# Patient Record
Sex: FEMALE | Employment: PART TIME | URBAN - METROPOLITAN AREA
[De-identification: names, ages, dates, MRNs, and addresses within clinical notes are randomized per-mention and may not be internally consistent; named-entity substitution may affect disease eponyms.]

---

## 2021-05-28 ENCOUNTER — RECORDS - HEALTHEAST (OUTPATIENT)
Dept: ADMINISTRATIVE | Facility: CLINIC | Age: 41
End: 2021-05-28

## 2022-03-17 ENCOUNTER — OFFICE VISIT (OUTPATIENT)
Dept: FAMILY MEDICINE | Facility: CLINIC | Age: 42
End: 2022-03-17
Payer: COMMERCIAL

## 2022-03-17 VITALS
HEIGHT: 61 IN | SYSTOLIC BLOOD PRESSURE: 124 MMHG | BODY MASS INDEX: 31.51 KG/M2 | HEART RATE: 75 BPM | OXYGEN SATURATION: 98 % | DIASTOLIC BLOOD PRESSURE: 88 MMHG | WEIGHT: 166.9 LBS

## 2022-03-17 DIAGNOSIS — R76.11 MANTOUX: POSITIVE: Primary | ICD-10-CM

## 2022-03-17 DIAGNOSIS — Z23 NEED FOR IMMUNIZATION AGAINST TYPHOID: ICD-10-CM

## 2022-03-17 PROBLEM — N97.9 FEMALE INFERTILITY: Status: ACTIVE | Noted: 2020-07-30

## 2022-03-17 PROCEDURE — 90471 IMMUNIZATION ADMIN: CPT | Performed by: NURSE PRACTITIONER

## 2022-03-17 PROCEDURE — 90691 TYPHOID VACCINE IM: CPT | Performed by: NURSE PRACTITIONER

## 2022-03-17 PROCEDURE — 99202 OFFICE O/P NEW SF 15 MIN: CPT | Mod: 25 | Performed by: NURSE PRACTITIONER

## 2022-03-17 NOTE — PROGRESS NOTES
"Assessment & Plan     ICD-10-CM    1. Mantoux: positive  R76.11    2. Need for immunization against typhoid  Z23 TYPHOID VACCINE, IM     Patient reports that all she needs/wants right now is a typhoid vaccine.  Previously well-tolerated.  This was given today.  Does have a history of positive Mento.  Not treated for latent tuberculosis.  No pulmonary symptoms.  Reports that she gets periodic chest x-rays which all have been clear.    Reviewed family medicine note x2    Subjective     HPI     Travelling to [facility] and [facility] in 2 weeks. Has had typhoid in the past. Well tolerated.      History of positive mantoux.  Has had negative CXR when needed.    Review of Systems - negative except for what's listed in the HPI      Objective    /88   Pulse 75   Ht 1.537 m (5' 0.5\")   Wt 75.7 kg (166 lb 14.4 oz)   LMP 03/10/2022 (Approximate)   SpO2 98%   Breastfeeding No   BMI 32.06 kg/m    Physical Exam   General appearance - alert, well appearing, and in no distress  Mental status - alert, oriented to person, place, and time  Eyes - sclera white  Lymphatics - no palpable lymphadenopathy  Chest - clear to auscultation, no wheezes, rales or rhonchi, symmetric air entry  Heart - normal rate and regular rhythm, S1 and S2 normal, no murmurs noted  Abdomen - soft, nontender, nondistended, no masses or organomegaly  Neurological - alert, oriented, normal speech, no focal findings or movement disorder noted, neck supple without rigidity, cranial nerves II through XII intact, motor and sensory grossly normal bilaterally, normal muscle tone, no tremors, strength 5/5  Extremities - peripheral pulses normal, no peripheral edema  Skin - normal coloration and turgor.    Abdulaziz Ryder, CNP    This note has been dictated using voice recognition software. Any grammatical or context distortions are unintentional and inherent to the software.    "

## 2022-06-09 ENCOUNTER — OFFICE VISIT (OUTPATIENT)
Dept: FAMILY MEDICINE | Facility: CLINIC | Age: 42
End: 2022-06-09
Payer: COMMERCIAL

## 2022-06-09 VITALS
BODY MASS INDEX: 32.08 KG/M2 | SYSTOLIC BLOOD PRESSURE: 153 MMHG | HEART RATE: 91 BPM | TEMPERATURE: 98.3 F | WEIGHT: 167 LBS | DIASTOLIC BLOOD PRESSURE: 101 MMHG | OXYGEN SATURATION: 100 % | RESPIRATION RATE: 16 BRPM

## 2022-06-09 DIAGNOSIS — R03.0 ELEVATED BLOOD PRESSURE READING WITHOUT DIAGNOSIS OF HYPERTENSION: ICD-10-CM

## 2022-06-09 DIAGNOSIS — J06.9 VIRAL URI WITH COUGH: Primary | ICD-10-CM

## 2022-06-09 PROCEDURE — 99213 OFFICE O/P EST LOW 20 MIN: CPT | Mod: CS | Performed by: FAMILY MEDICINE

## 2022-06-09 PROCEDURE — U0005 INFEC AGEN DETEC AMPLI PROBE: HCPCS | Performed by: FAMILY MEDICINE

## 2022-06-09 PROCEDURE — U0003 INFECTIOUS AGENT DETECTION BY NUCLEIC ACID (DNA OR RNA); SEVERE ACUTE RESPIRATORY SYNDROME CORONAVIRUS 2 (SARS-COV-2) (CORONAVIRUS DISEASE [COVID-19]), AMPLIFIED PROBE TECHNIQUE, MAKING USE OF HIGH THROUGHPUT TECHNOLOGIES AS DESCRIBED BY CMS-2020-01-R: HCPCS | Performed by: FAMILY MEDICINE

## 2022-06-09 NOTE — PROGRESS NOTES
Assessment/ Plan  1. Viral URI with cough  This could be COVID-despite negative home test  Test with PCR  Isolation until test is back  Symptomatic treatment for illness  - Influenza A & B Antigen; Future  - Symptomatic; Unknown COVID-19 Virus (Coronavirus) by PCR; Future  - Symptomatic; Unknown COVID-19 Virus (Coronavirus) by PCR Nose  Elevated blood pressure without diagnosis of hypertension  Remained elevated on recheck  Not taking any decongestants or other medications currently.  Recommend close follow-up, 2 to 3 weeks.  Asked her to check blood pressures at work/home as able.  Bring in numbers.  Body mass index is 32.08 kg/m .    Subjective  CC:  chief complaint  HPI:  4-day history of upper respiratory symptoms.  Itchy eyes, sore throat, cough, subjective fever,  No significant shortness of breath.  Sneezing, abdominal discomfort, no history of allergies.  Works as a nursing assistant.  No known recent contacts.  Patient Active Problem List   Diagnosis     Borderline hypertension     Female infertility     Mantoux: positive     Current medications reviewed as follows:  No current outpatient medications on file prior to visit.  No current facility-administered medications on file prior to visit.     History   Smoking Status     Never Smoker   Smokeless Tobacco     Never Used     Social History     Social History Narrative     Not on file     Patient Care Team:  No Ref-Primary, Physician as PCP - Abdulaziz Garcia NP as Assigned PCP  ROS  As above      Objective  Physical Exam  Vitals:    06/09/22 0827 06/09/22 0915   BP: (!) 153/107 (!) 153/101   BP Location: Left arm Right arm   Patient Position: Sitting Sitting   Cuff Size: Adult Regular Adult Regular   Pulse: 87 91   Resp: 16    Temp: 98.3  F (36.8  C)    TempSrc: Temporal    SpO2: 100%    Weight: 75.8 kg (167 lb)      Patient is alert, oriented and in no distress.    Conjunctiva, lids appear normal.  Nares are normal bilaterally.    TMs are  visualized bilaterally and appear normal    There is no adenopathy in the neck.  Oral cavity is without any notable lesion,   oropharynx appears normal without any erythema, exudate, petechia    Chest appears normal,   auscultation reveals :  normal breath sounds,   no wheezing,  no rales   no rhonchi.    Diagnostics  Pending  Please note: Voice recognition software was used in this dictation.  It may therefore contain typographical errors.    Answers for HPI/ROS submitted by the patient on 6/9/2022  Headache Symptoms are: worsened  How often are you getting headaches or migraines? : Not often  Are you able to do normal daily activities when you have a migraine?: Yes  Migraine Rescue/Relief Medications:: Tylenol  Effectiveness of rescue/relief medications:: I get some relief  Migraine Preventative Medications:: no medications to prevent migraines  ER or UC Visits:: 0 times  Your back pain is: new  What do you think is the original cause of your back pain?: not sure  When did you first notice your back pain? : yesterday  How would you describe your back pain? : sharp  How often do you feel your back pain? : comes and goes  Where is your back pain located? : right lower back  Where does your back pain spread? : nowhere  Since you noticed your back pain, how has it changed? : always present, but gets better and worse  Does your back pain interfere with your job?: No  On a scale of 1-10 (10 being the worst), how strong is your back pain?: 5  What makes your back pain worse? : bending  Acupuncture:: not tried  Acetaminophen: helpful  Activity or Exercise: not tried  Chiropractor: not tried  Cold: not tried  Heat: not tried  Massage: not tried  Muscle relaxants : not tried  NSAIDS (Ibuprofen, Naproxen) : not tried  Opioids: not tried  Physical Therapy: not tried  Rest: helpful  Steroid Injection: not tried  Stretching : not tried  Surgery: not tried  TENS Unit: not tried  Topical pain relievers : helpful  Do you see any  other healthcare providers for your back pain? : None  How many servings of fruits and vegetables do you eat daily?: 2-3  On average, how many sweetened beverages do you drink each day (Examples: soda, juice, sweet tea, etc.  Do NOT count diet or artificially sweetened beverages)?: 2  How many minutes a day do you exercise enough to make your heart beat faster?: 9 or less  How many days a week do you exercise enough to make your heart beat faster?: 3 or less  How many days per week do you miss taking your medication?: 0  What is the reason for your visit today?: Proper check up of how I am feeling  What are your symptoms?: Coughing ,chest, pain, runny nose, headache  How would you describe these symptoms?: Severe  Are your symptoms:: Worsening  Have you had these symptoms before?: No  Is there anything that makes you feel better?: No

## 2022-06-09 NOTE — LETTER
June 9, 2022      Noah Dyegbeh [address]   [place] [address]        To Whom It May Concern,      Noah was seen today in the clinic for illness.  She should be excused from work 6/8 -6/10/2022.  She may return to work when she is feeling better and her COVID PCR test, done today, returns (and is negative).        Sincerely,        Gregory Almeida MD

## 2022-06-10 ENCOUNTER — TELEPHONE (OUTPATIENT)
Dept: NURSING | Facility: CLINIC | Age: 42
End: 2022-06-10
Payer: COMMERCIAL

## 2022-06-10 LAB — SARS-COV-2 RNA RESP QL NAA+PROBE: POSITIVE

## 2022-06-10 NOTE — TELEPHONE ENCOUNTER
Coronavirus (COVID-19) Notification    Caller Name (Patient, parent, daughter/son, grandparent, etc)  Patient    Reason for call  Notify of Positive Coronavirus (COVID-19) lab results, assess symptoms,  review [facility] recommendations    Lab Result    Lab test:  2019-nCoV rRt-PCR or SARS-CoV-2 PCR    Oropharyngeal AND/OR nasopharyngeal swabs is POSITIVE for 2019-nCoV RNA/SARS-COV-2 PCR (COVID-19 virus)      Gather patient reported symptoms   Assessment   Current Symptoms at time of phone call, reported by patient: (if no symptoms, document: No symptoms] Cough   Date of symptom(s) onset (if applicable) 6/5     If at time of call, Patients symptoms have worsened, the Patient should contact 911 or have someone drive them to Emergency Dept promptly:      If Patient calling 911, inform 911 personal that you have tested positive for the Coronavirus (COVID-19).  Place mask on and await 911 to arrive.    If Emergency Dept, If possible, please have another adult drive you to the Emergency Dept but you need to wear mask when in contact with other people.      Treatment Options:   Patient classified as COVID treatment eligible by Epic high risk algorithm: Yes  Is the patient symptomatic at the time of result notification? Yes. Was the onset of symptoms within the last 5 days? Yes.   There are now oral medications available for the treatment of COVID-19.  Taking one of these medications within the first five days of symptoms (when people may not yet feel severely ill) has been shown to make people feel better, prevent them from getting sicker, and preventing hospitalization and death.   Does the patient agree to have a visit with a provider to discuss treatment options? Yes. Is the patient seen at [facility]?  No: Warm transfer caller to [phone] to be scheduled with a virtual urgent provider.  During transfer, instruct  on appropriate time frame for visit     Review information with  Patient    Your result was positive. This means you have COVID-19 (coronavirus).    How can I protect others?    These guidelines are for isolating before returning to work, school or .    If you DO have symptoms    Stay home and away from others     For at least 5 days after your symptoms started, AND    You are fever free for 24 hours (with no medicine that reduces fever), AND    Your other symptoms are better    Wear a mask for 10 full days anytime you are around others    If you DON'T have symptoms    Stay home and away from others for at least 5 days after your positive test    Wear a mask for 10 full days anytime you are around others    There may be different guidelines for healthcare facilities.  Please check with the specific sites before arriving.    If you have been told by a doctor that you were severely ill with COVID-19 or are immunocompromised, you should isolate for at least 10 days.    You should not go back to work until you meet the guidelines above for ending your home isolation. You don't need to be retested for COVID-19 before going back to work--studies show that you won't spread the virus if it's been at least 10 days since your symptoms started (or 20 days, if you have a weak immune system).    Employers, schools, and daycares: This is an official notice for this person's medical guidelines for returning in-person.  They must meet the above guidelines before going back to work, school or  in person.    You will receive a positive COVID-19 letter via [employer] or the mail soon with additional self-care information (exception, no letters will be sent to presurgical/preprocedure patients).    Would you like me to review some of that information with you now?  No    If you were tested for an upcoming procedure, please contact your provider for next steps.    Latanya Long

## 2022-06-11 ENCOUNTER — VIRTUAL VISIT (OUTPATIENT)
Dept: URGENT CARE | Facility: CLINIC | Age: 42
End: 2022-06-11
Payer: COMMERCIAL

## 2022-06-11 DIAGNOSIS — U07.1 CLINICAL DIAGNOSIS OF COVID-19: Primary | ICD-10-CM

## 2022-06-11 PROCEDURE — 99213 OFFICE O/P EST LOW 20 MIN: CPT | Mod: 95

## 2022-06-11 NOTE — PROGRESS NOTES
"Noah is a 41 year old who is being evaluated via a billable telephone visit.          Assessment & Plan     Clinical diagnosis of COVID-19    Patient is beyond treatment window and symptoms are now resolved. Patient questioned if testing is needed again. Explained that PCR test can remain positive for up to 90 days and quarantine guidelines.                 COVID-19 positive patient.  Encounter for consideration of medication intervention. Patient does not qualify for a prescription. Full discussion with patient including medication options, risks and benefits. Potential drug interactions reviewed with patient.       Estimated body mass index is 32.08 kg/m  as calculated from the following:    Height as of 3/17/22: 1.537 m (5' 0.5\").    Weight as of 6/9/22: 75.8 kg (167 lb).  No results found for: GFRESTIMATED  Lab Results   Component Value Date    [id number] Positive (A) 06/09/2022       No follow-ups on file.    Virtual Urgent Care  [facility] VIRTUAL URGENT [facility]    Subjective   Noah is a 41 year old who presents for the following health issues     HPI       COVID-19 Symptom Review  How many days ago did these symptoms start? 6/5    Are any of the following symptoms significant for you?    New or worsening difficulty breathing? No    Worsening cough? No    Fever or chills? No    Headache: no    Sore throat: no    Chest pain: no    Diarrhea: no    Body aches? no    What treatments has patient tried? none   Does patient live in a nursing home, group home, or shelter? no  Does patient have a way to get food/medications during quarantined? Yes, I have a friend or family member who can help me.              Review of Systems   Constitutional, HEENT, cardiovascular, pulmonary, gi and gu systems are negative, except as otherwise noted.      Objective           Vitals:  No vitals were obtained today due to virtual visit.    Physical Exam   healthy, alert and no distress  PSYCH: Alert and oriented times 3; " coherent speech, normal   rate and volume, able to articulate logical thoughts, able   to abstract reason, no tangential thoughts, no hallucinations   or delusions  Her affect is normal and pleasant  RESP: No cough, no audible wheezing, able to talk in full sentences  Remainder of exam unable to be completed due to telephone visits                Phone call duration: 4 minutes

## 2022-08-15 NOTE — PROGRESS NOTES
"  Assessment & Plan       ICD-10-CM    1. Chronic cough  R05.3 fluticasone-salmeterol (ADVAIR) 100-50 MCG/ACT inhaler   Talk to patient about her concerns at this point the possibility is asthma related.  We will start Advair twice a day.  Warning signs side effects were discussed.  She is to follow-up in 4 weeks.  Return in about 4 weeks (around 9/13/2022) for recheck.    Shawn Persaud PA-C  [facility]   Noah is a 42 year old accompanied by her self, presenting for the following health issues:  Cough      History of Present Illness       Reason for visit:  Cough  Symptom onset:  More than a month  Symptoms include:  Cough  Symptom intensity:  Severe  Symptom progression:  Staying the same  Had these symptoms before:  Yes  Has tried/received treatment for these symptoms:  No  What makes it worse:  No  What makes it better:  No    She eats 4 or more servings of fruits and vegetables daily.She consumes 3 sweetened beverage(s) daily.She exercises with enough effort to increase her heart rate 9 or less minutes per day.  She exercises with enough effort to increase her heart rate 3 or less days per week.   She is taking medications regularly.   cough off and on for years.   No wheezing or short of breath.   No runny nose, sneezing or itchy eye.   No GERD symptoms.  Tx: cough drops.   History of positive Mantoux. tx for LTB. Neg chest x-ray in the past.     Previous COVID testing and neg.     Care Everywhere Reviewed      Review of Systems   Constitutional, HEENT, cardiovascular, pulmonary, gi and gu systems are negative, except as otherwise noted.      Objective    /89   Pulse 64   Temp 98.2  F (36.8  C) (Tympanic)   Resp 14   Ht 1.537 m (5' 0.5\")   Wt 74.4 kg (164 lb)   SpO2 100%   BMI 31.50 kg/m    Body mass index is 31.5 kg/m .  Physical Exam   GENERAL: healthy, alert and no distress  EYES: Eyes grossly normal to inspection, PERRL and conjunctivae and sclerae " normal  HENT: ear canals and TM's normal, nose and mouth without ulcers or lesions  NECK: no adenopathy, no asymmetry, masses, or scars and thyroid normal to palpation  RESP: lungs clear to auscultation - no rales, rhonchi or wheezes  CV: regular rate and rhythm, normal S1 S2, no S3 or S4, no murmur, click or rub, no peripheral edema and peripheral pulses strong  ABDOMEN: soft, nontender, no hepatosplenomegaly, no masses and bowel sounds normal  MS: no gross musculoskeletal defects noted, no edema  SKIN: no suspicious lesions or rashes  NEURO: Normal strength and tone, mentation intact and speech normal  PSYCH: mentation appears normal, affect normal/bright

## 2022-08-16 ENCOUNTER — OFFICE VISIT (OUTPATIENT)
Dept: FAMILY MEDICINE | Facility: CLINIC | Age: 42
End: 2022-08-16
Payer: COMMERCIAL

## 2022-08-16 VITALS
RESPIRATION RATE: 14 BRPM | HEIGHT: 61 IN | TEMPERATURE: 98.2 F | OXYGEN SATURATION: 100 % | BODY MASS INDEX: 30.96 KG/M2 | SYSTOLIC BLOOD PRESSURE: 139 MMHG | HEART RATE: 64 BPM | DIASTOLIC BLOOD PRESSURE: 89 MMHG | WEIGHT: 164 LBS

## 2022-08-16 DIAGNOSIS — R05.3 CHRONIC COUGH: Primary | ICD-10-CM

## 2022-08-16 PROCEDURE — 99213 OFFICE O/P EST LOW 20 MIN: CPT | Performed by: PHYSICIAN ASSISTANT

## 2022-08-16 RX ORDER — FLUTICASONE PROPIONATE AND SALMETEROL 100; 50 UG/1; UG/1
1 POWDER RESPIRATORY (INHALATION) 2 TIMES DAILY
Qty: 1 EACH | Refills: 0 | Status: SHIPPED | OUTPATIENT
Start: 2022-08-16 | End: 2023-02-20

## 2022-08-16 ASSESSMENT — PAIN SCALES - GENERAL: PAINLEVEL: NO PAIN (0)

## 2022-12-07 ENCOUNTER — OFFICE VISIT (OUTPATIENT)
Dept: FAMILY MEDICINE | Facility: CLINIC | Age: 42
End: 2022-12-07
Payer: COMMERCIAL

## 2022-12-07 VITALS
WEIGHT: 165.8 LBS | HEART RATE: 65 BPM | BODY MASS INDEX: 30.51 KG/M2 | DIASTOLIC BLOOD PRESSURE: 99 MMHG | HEIGHT: 62 IN | SYSTOLIC BLOOD PRESSURE: 152 MMHG | TEMPERATURE: 98 F | OXYGEN SATURATION: 99 % | RESPIRATION RATE: 16 BRPM

## 2022-12-07 DIAGNOSIS — Z71.84 TRAVEL ADVICE ENCOUNTER: Primary | ICD-10-CM

## 2022-12-07 PROCEDURE — 99402 PREV MED CNSL INDIV APPRX 30: CPT | Mod: GA | Performed by: NURSE PRACTITIONER

## 2022-12-07 RX ORDER — ATOVAQUONE AND PROGUANIL HYDROCHLORIDE 250; 100 MG/1; MG/1
1 TABLET, FILM COATED ORAL DAILY
Qty: 33 TABLET | Refills: 0 | Status: SHIPPED | OUTPATIENT
Start: 2022-12-07 | End: 2023-02-20

## 2022-12-07 ASSESSMENT — PAIN SCALES - GENERAL: PAINLEVEL: NO PAIN (0)

## 2022-12-07 NOTE — PATIENT INSTRUCTIONS
Thank you for visiting the [facility] : [phone]  Today December 7, 2022 you received the    NO Vaccines today     Covid and flu is recommended       Follow up vaccine appointments can be made as a NURSE ONLY visit at the Travel Clinic, (BE PREPARED TO WAIT, ) or at designated [facility].    If you are receiving the Rabies vaccines series, it is important that you follow the exact schedule ordered.     Pre-travel     We recommend that you purchase Medical Evacuation Insurance prior to your departure.  Https://wwwnc.cdc.gov/travel/page/insurance    [place] your travel plans with the  Department of ideeli through STEP ( Smart Traveler Enrollment Program ) https://step.state.gov.  STEP is a free service to allow U[place] citizens and nationals traveling and living abroad to enroll their trip with the nearest U.S. Embassy or Consulate.    Animal Exposure: Avoid all mammals even if they look healthy.  If there is a bite, scratch or even a lick, wash area immediately with soap and water for 15 minutes and seek medical care within 24 hours for evaluation of Rabies post exposure treatment.  Contact your Medical Evacuation Insurance.    COVID 19 (Sars Cov2) prevention strategies  Physical distancing: Maintain 6 foot (2m) from others.              Avoid large gatherings and public transportation.   Avoid indoor shopping malls, theaters and restaurants   Practice consistent mask wearing covering the nose, mouth and underneath the chin when unable to maintain 6 foot distance from others.  Hand washing: frequent, thorough handwashing with soap and water for 20 seconds (or using a hand  containing 60% alcohol)   Avoid touching face, nose, eyes, mouth unless you have done appropriate hand washing as above.   Clean high touch surfaces with approved disinfectant against Covid 19  (70% Ethanol ) or a bleach solution (add 20 mL (4 teaspoons) of bleach to 1 L (1 quart) of  water;)  Be careful not to breath or touch bleach.      Travel Covid 19 Testing:  updated 12/06/2021  International travelers: Pre-travel: diagnostic testing (antigen or PCR) may be required for entry:  See country specific Embassy websites or airline websites.    Post travel: CDC recommends getting tested 3-5 days after your trip     COVID-19 testing scheduling number for pre-travel through [facility]  [phone] (Must have an order). Available 24 hours a day.  You can also schedule through My Chart.     Post-travel illness:  Contact your provider or [facility] if you develop a fever, rash, cough, diarrhea or other symptoms for up to 1 year after travel.  Inform your healthcare provider when and where you traveled to.    Please call the [facility] with any questions [phone]  Or send your provider a 'My Chart' note.

## 2022-12-07 NOTE — PROGRESS NOTES
"Nurse Note ( Pre-Travel Consult)      Itinerary:  [facility]      Departure Date: 12-13-22      Return Date: 1-4-23      Length of Trip 3 weeks      Reason for Travel: Visiting friends and relatives           Urban or rural: urban      Accommodations: Family home        IMMUNIZATION HISTORY  Have you received any immunizations within the past 4 weeks?  No  Have you ever fainted from having your blood drawn or from an injection?  No  Have you ever had a fever reaction to vaccination?  No  Have you ever had any bad reaction or side effect from any vaccination?  No  Have you ever had hepatitis A or B vaccine?  Yes  Do you live (or work closely) with anyone who has AIDS, an AIDS-like condition, any other immune disorder or who is on chemotherapy for cancer?  No  Do you have a family history of immunodeficiency?  No  Have you received any injection of immune globulin or any blood products during the past 12 months?  No    Patient roomed by Christine Larry RN      Subjective Wonlue T Kegbeh is a 42 year old female seen today alone for counsultation for international travel.   Patient will be departing in  5 day(s) and  traveling alone.      Patient itinerary :  will be in the urban region [facility]  which risk for Malaria and Yellow Fever. exposure.      Patient's activities will include visiting friends and relatives.    Patient's country of birth is [place] [facility] [place]    Special medical concerns: elevated Blood pressure today, re- checked     Pre-travel questionnaire was completed by patient and reviewed by provider.     Vitals: BP (!) 160/99   Pulse 65   Temp 98  F (36.7  C) (Oral)   Resp 16   Ht 1.568 m (5' 1.75\")   Wt 75.2 kg (165 lb 12.8 oz)   LMP 11/30/2022 (Exact Date)   SpO2 99%   BMI 30.57 kg/m    BMI= Body mass index is 30.57 kg/m .    EXAM:  General:  Well-nourished, well-developed in no acute distress.  Appears to be stated age, interacts appropriately and expresses understanding of information given to " patient.    Current Outpatient Medications   Medication Sig Dispense Refill     fluticasone-salmeterol (ADVAIR) 100-50 MCG/ACT inhaler Inhale 1 puff into the lungs 2 times daily (Patient not taking: Reported on 12/7/2022) 1 each 0     Patient Active Problem List   Diagnosis     Borderline hypertension     Female infertility     Mantoux: positive     No Known Allergies      Immunizations discussed include:   Covid 19: Up to date  Hepatitis A:  Immune  Hepatitis B: Immune  Influenza: Declined    Typhoid: Up to date  Rabies: Declined  reviewed managment of a animal bite or scratch (washing wound, seek medical care within 24 hours for post exposure prophylaxis ) and Insufficient time to vaccinate  Yellow Fever: Up to date  Japanese Encephalitis: Not indicated  Meningococcus: Not indicated  Tetanus/Diphtheria: Up to date  Measles/Mumps/Rubella: Up to date  Cholera: Not needed and Not available  Polio: Not indicated  Pneumococcal: Under age of 65  Varicella: Up to date  Shingrix: Not indicated  HPV:  Not indicated     TB: low risk     Altitude Exposure on this trip: no  Past tolerance to Altitude: na    ASSESSMENT/PLAN:  Noah was seen today for travel clinic.    Diagnoses and all orders for this visit:    Travel advice encounter  -     atovaquone-proguanil (MALARONE) 250-100 MG tablet; Take 1 tablet by mouth daily Start 2 days before exposure to Malaria and continue daily till  7 days after exposure.      I have reviewed general recommendations for safe travel   including: food/water precautions, insect precautions, safer sex   practices given high prevalence of Zika, HIV and other STDs,   roadway safety. Educational materials and [facility] report provided.    Malaraia prophylaxis recommended: Malarone  Symptomatic treatment for traveler's diarrhea: loperamide/diphenoxylate  Altitude illness prevention and treatment: na      Evacuation insurance advised and resources were provided to patient.    Total visit time 30 minutes   with over 50% of time spent counseling patient and shared decision making as detailed above.    Birgit Hammond CNP  Certificate in Travel Health

## 2023-01-31 ENCOUNTER — TELEPHONE (OUTPATIENT)
Dept: FAMILY MEDICINE | Facility: CLINIC | Age: 43
End: 2023-01-31
Payer: COMMERCIAL

## 2023-01-31 NOTE — TELEPHONE ENCOUNTER
Reason for Call:  Appointment Request    Patient requesting this type of appt:  Preventive     Requested provider: No Ref-Primary, Physician    Reason patient unable to be scheduled: Has no PCP, needs PAP test    When does patient want to be seen/preferred time: 3-7 days    Comments: call back to make appt    Okay to leave a detailed message?: Yes at Cell number on file:    Telephone Information:   Mobile [phone]       Call taken on 1/31/2023 at 1:41 PM by Jennifer Salinas

## 2023-02-20 ENCOUNTER — OFFICE VISIT (OUTPATIENT)
Dept: FAMILY MEDICINE | Facility: CLINIC | Age: 43
End: 2023-02-20
Payer: COMMERCIAL

## 2023-02-20 VITALS
OXYGEN SATURATION: 100 % | HEIGHT: 62 IN | SYSTOLIC BLOOD PRESSURE: 148 MMHG | RESPIRATION RATE: 20 BRPM | TEMPERATURE: 98.2 F | WEIGHT: 168.6 LBS | BODY MASS INDEX: 31.03 KG/M2 | DIASTOLIC BLOOD PRESSURE: 98 MMHG | HEART RATE: 81 BPM

## 2023-02-20 DIAGNOSIS — Z12.4 CERVICAL CANCER SCREENING: ICD-10-CM

## 2023-02-20 DIAGNOSIS — Z31.9 DESIRE FOR PREGNANCY: ICD-10-CM

## 2023-02-20 DIAGNOSIS — Z11.59 NEED FOR HEPATITIS C SCREENING TEST: ICD-10-CM

## 2023-02-20 DIAGNOSIS — I10 BENIGN ESSENTIAL HYPERTENSION: ICD-10-CM

## 2023-02-20 DIAGNOSIS — Z00.00 ROUTINE GENERAL MEDICAL EXAMINATION AT A HEALTH CARE FACILITY: Primary | ICD-10-CM

## 2023-02-20 DIAGNOSIS — Z11.4 SCREENING FOR HIV (HUMAN IMMUNODEFICIENCY VIRUS): ICD-10-CM

## 2023-02-20 DIAGNOSIS — Z13.220 SCREENING FOR HYPERLIPIDEMIA: ICD-10-CM

## 2023-02-20 LAB
ALBUMIN SERPL BCG-MCNC: 4 G/DL (ref 3.5–5.2)
ALP SERPL-CCNC: 65 U/L (ref 35–104)
ALT SERPL W P-5'-P-CCNC: 9 U/L (ref 10–35)
ANION GAP SERPL CALCULATED.3IONS-SCNC: 9 MMOL/L (ref 7–15)
AST SERPL W P-5'-P-CCNC: 23 U/L (ref 10–35)
BILIRUB SERPL-MCNC: 0.2 MG/DL
BUN SERPL-MCNC: 10.4 MG/DL (ref 6–20)
CALCIUM SERPL-MCNC: 9.1 MG/DL (ref 8.6–10)
CHLORIDE SERPL-SCNC: 102 MMOL/L (ref 98–107)
CHOLEST SERPL-MCNC: 274 MG/DL
CREAT SERPL-MCNC: 0.95 MG/DL (ref 0.51–0.95)
DEPRECATED HCO3 PLAS-SCNC: 29 MMOL/L (ref 22–29)
ERYTHROCYTE [DISTWIDTH] IN BLOOD BY AUTOMATED COUNT: 12.7 % (ref 10–15)
GFR SERPL CREATININE-BSD FRML MDRD: 77 ML/MIN/1.73M2
GLUCOSE SERPL-MCNC: 70 MG/DL (ref 70–99)
HCT VFR BLD AUTO: 37.3 % (ref 35–47)
HDLC SERPL-MCNC: 59 MG/DL
HGB BLD-MCNC: 12.1 G/DL (ref 11.7–15.7)
LDLC SERPL CALC-MCNC: 197 MG/DL
MCH RBC QN AUTO: 26 PG (ref 26.5–33)
MCHC RBC AUTO-ENTMCNC: 32.4 G/DL (ref 31.5–36.5)
MCV RBC AUTO: 80 FL (ref 78–100)
NONHDLC SERPL-MCNC: 215 MG/DL
PLATELET # BLD AUTO: 192 10E3/UL (ref 150–450)
POTASSIUM SERPL-SCNC: 3.1 MMOL/L (ref 3.4–5.3)
PROT SERPL-MCNC: 7.2 G/DL (ref 6.4–8.3)
RBC # BLD AUTO: 4.65 10E6/UL (ref 3.8–5.2)
SODIUM SERPL-SCNC: 140 MMOL/L (ref 136–145)
TRIGL SERPL-MCNC: 90 MG/DL
WBC # BLD AUTO: 4.7 10E3/UL (ref 4–11)

## 2023-02-20 PROCEDURE — 36415 COLL VENOUS BLD VENIPUNCTURE: CPT

## 2023-02-20 PROCEDURE — 99213 OFFICE O/P EST LOW 20 MIN: CPT | Mod: 25

## 2023-02-20 PROCEDURE — 99396 PREV VISIT EST AGE 40-64: CPT

## 2023-02-20 PROCEDURE — 86803 HEPATITIS C AB TEST: CPT

## 2023-02-20 PROCEDURE — 87389 HIV-1 AG W/HIV-1&-2 AB AG IA: CPT

## 2023-02-20 PROCEDURE — 80061 LIPID PANEL: CPT

## 2023-02-20 PROCEDURE — 80053 COMPREHEN METABOLIC PANEL: CPT

## 2023-02-20 PROCEDURE — 85027 COMPLETE CBC AUTOMATED: CPT

## 2023-02-20 RX ORDER — LABETALOL 100 MG/1
100 TABLET, FILM COATED ORAL 2 TIMES DAILY
Qty: 60 TABLET | Refills: 0 | Status: SHIPPED | OUTPATIENT
Start: 2023-02-20 | End: 2023-02-24

## 2023-02-20 RX ORDER — PRENATAL VIT/IRON FUM/FOLIC AC 27MG-0.8MG
1 TABLET ORAL DAILY
Qty: 90 TABLET | Refills: 3 | Status: SHIPPED | OUTPATIENT
Start: 2023-02-20

## 2023-02-20 ASSESSMENT — ENCOUNTER SYMPTOMS
EYE PAIN: 0
FREQUENCY: 0
MYALGIAS: 0
HEMATOCHEZIA: 0
DIZZINESS: 0
ABDOMINAL PAIN: 0
HEMATURIA: 0
SHORTNESS OF BREATH: 0
DYSURIA: 0
PARESTHESIAS: 0
JOINT SWELLING: 0
HEARTBURN: 0
NAUSEA: 0
CONSTIPATION: 0
ARTHRALGIAS: 0
HEADACHES: 0
BREAST MASS: 0
DIARRHEA: 0
WEAKNESS: 0
NERVOUS/ANXIOUS: 0
FEVER: 0
COUGH: 0
CHILLS: 0
PALPITATIONS: 0
SORE THROAT: 0

## 2023-02-20 ASSESSMENT — PAIN SCALES - GENERAL: PAINLEVEL: NO PAIN (0)

## 2023-02-20 NOTE — PATIENT INSTRUCTIONS
Another option for OB/GYN care is [facility] the phone number is [phone].    You can chose any prenatal vitamin, just make sure it has 0.4 mg of folic acid.    Please reach out with any concerns with the blood pressure medication, such as sign or symptoms of low blood pressure or low heart rate (dizziness, lightheadedness, passing out, or fatigue).

## 2023-02-20 NOTE — PROGRESS NOTES
SUBJECTIVE:   CC: Noah is an 42 year old who presents for preventive health visit.     Patient has been advised of split billing requirements and indicates understanding: Yes     Healthy Habits:     Getting at least 3 servings of Calcium per day:  Yes    Bi-annual eye exam:  NO    Dental care twice a year:  NO    Sleep apnea or symptoms of sleep apnea:  None    Diet:  Regular (no restrictions)    Frequency of exercise:  None    Taking medications regularly:  Yes    Medication side effects:  None    PHQ-2 Total Score: 1    Additional concerns today:  No    Patient reports she has been trying to pregnant.  Her and her spouse have been trying for at least 6 months.  She gets a regular period every month.  She has never been pregnant.  She has not had any work-up for this.  They have been having unprotected intercourse, but patient does not disclose how often, and does not sound like she is tracking ovulation.    Patient has no medical history.  She denies any family history of cancer, diabetes, heart disease.  She does not smoke.  She is not drinking.    Today's PHQ-2 Score:   PHQ-2 ( 1999 Pfizer) 2/20/2023   Q1: Little interest or pleasure in doing things 1   Q2: Feeling down, depressed or hopeless 0   PHQ-2 Score 1   Q1: Little interest or pleasure in doing things Not at all   Q2: Feeling down, depressed or hopeless Not at all   PHQ-2 Score 0     Have you ever done Advance Care Planning? (For example, a Health Directive, POLST, or a discussion with a medical provider or your loved ones about your wishes): No, advance care planning information given to patient to review.  Patient plans to discuss their wishes with loved ones or provider.      Social History     Tobacco Use     Smoking status: Never     Smokeless tobacco: Never   Substance Use Topics     Alcohol use: Never     If you drink alcohol do you typically have >3 drinks per day or >7 drinks per week? Not applicable    Alcohol Use 2/20/2023   Prescreen: >3  "drinks/day or >7 drinks/week? Not Applicable     Reviewed orders with patient.  Reviewed health maintenance and updated orders accordingly - Yes    Breast Cancer Screening:    Breast CA Risk Assessment (FHS-7) 2/20/2023   Do you have a family history of breast, colon, or ovarian cancer? No / Unknown     Patient under 40 years of age: Routine Mammogram Screening not recommended.   Pertinent mammograms are reviewed under the imaging tab.    History of abnormal Pap smear: NO - age 30-65 PAP every 5 years with negative HPV co-testing recommended     Reviewed and updated as needed this visit by clinical staff   Tobacco  Allergies  Meds            Reviewed and updated as needed this visit by Provider   Tobacco   Meds   Med Hx   Fam Hx           Review of Systems   Constitutional: Negative for chills and fever.   HENT: Negative for congestion, ear pain, hearing loss and sore throat.    Eyes: Negative for pain and visual disturbance.   Respiratory: Negative for cough and shortness of breath.    Cardiovascular: Negative for chest pain, palpitations and peripheral edema.   Gastrointestinal: Negative for abdominal pain, constipation, diarrhea, heartburn, hematochezia and nausea.   Breasts:  Negative for breast mass and discharge.   Genitourinary: Negative for dysuria, frequency, genital sores, hematuria, pelvic pain, urgency, vaginal bleeding and vaginal discharge.   Musculoskeletal: Negative for arthralgias, joint swelling and myalgias.   Skin: Negative for rash.   Neurological: Negative for dizziness, weakness, headaches and paresthesias.   Psychiatric/Behavioral: Negative for mood changes. The patient is not nervous/anxious.        OBJECTIVE:   BP (!) 152/100 (BP Location: Right arm, Patient Position: Sitting, Cuff Size: Adult Regular)   Pulse 81   Temp 98.2  F (36.8  C) (Oral)   Resp 20   Ht 1.562 m (5' 1.5\")   Wt 76.5 kg (168 lb 9.6 oz)   LMP 01/23/2023 (Exact Date)   SpO2 100%   BMI 31.34 kg/m    Physical " Exam  Vitals reviewed.   Constitutional:       General: She is not in acute distress.  HENT:      Right Ear: Tympanic membrane, ear canal and external ear normal.      Left Ear: Tympanic membrane, ear canal and external ear normal.   Eyes:      Extraocular Movements: Extraocular movements intact.      Pupils: Pupils are equal, round, and reactive to light.   Cardiovascular:      Rate and Rhythm: Normal rate and regular rhythm.      Pulses: Normal pulses.      Heart sounds: Normal heart sounds. No murmur heard.  Pulmonary:      Effort: Pulmonary effort is normal. No respiratory distress.      Breath sounds: No wheezing.   Abdominal:      General: Abdomen is flat. Bowel sounds are normal. There is no distension.   Musculoskeletal:         General: Normal range of motion.      Cervical back: Normal range of motion. No rigidity.      Right lower leg: No edema.      Left lower leg: No edema.   Lymphadenopathy:      Cervical: No cervical adenopathy.   Neurological:      General: No focal deficit present.      Mental Status: She is alert.      Cranial Nerves: No cranial nerve deficit.      Sensory: No sensory deficit.      Motor: No weakness.      Gait: Gait normal.   Psychiatric:         Mood and Affect: Mood normal.         Thought Content: Thought content normal.       ASSESSMENT/PLAN:   Routine general medical examination at a health care facility  Patient presents for routine medical exam.  Biggest concern today is that she wants to get pregnant.  See below.  Elevated blood pressure on exam today.  Outside of this, no abnormal findings on physical exam.  We will get blood work today.  She has no family history of breast cancer, last Pap with HPV testing was 3 years ago.    Desire for pregnancy  Overall patient has been trying for over 6 months to get pregnant with no success.  She has regular menses every month.  She has not been seen by OB/GYN.  Recommend starting prenatal vitamin as she has not done this yet.  We  will check baseline blood work today, but overall refer to OB/GYN for possible infertility management as well as if she gets pregnant she would be considered high risk based on her age.  Appreciate recommendations and following.  - REVIEW OF HEALTH MAINTENANCE PROTOCOL ORDERS  - CBC with platelets; Future  - Prenatal Vit-Fe Fumarate-FA (PRENATAL MULTIVITAMIN W/IRON) 27-0.8 MG tablet; Take 1 tablet by mouth daily  Dispense: 90 tablet; Refill: 3  - Ob/Gyn Referral; Future    Benign essential hypertension  Upon review of the chart patient has had elevated blood pressures on several visits.  Not on medication.  We did discuss that if she is actively trying to get pregnant there are a few blood pressure meds that are recommended.  We will start her on labetalol, but I discussed with her if she develops any signs of lightheadedness, dizziness, passing out, feeling faint or fatigued, she should stop and contact me.  I will bring her back in this Friday for another blood pressure check and heart rate check.  She is going out of town from February 24 to March 11.  If she has any concerns or develops any symptoms over that time she should be seen where she is on vacation.  She has no chest pain, leg swelling, headache, or vision changes.  We will check a CMP today.  - Comprehensive metabolic panel (BMP + Alb, Alk Phos, ALT, AST, Total. Bili, TP); Future  - labetalol (NORMODYNE) 100 MG tablet; Take 1 tablet (100 mg) by mouth 2 times daily  Dispense: 60 tablet; Refill: 0    Screening for hyperlipidemia  Discussed recommended screening, ordered.  - Lipid Profile (Chol, Trig, HDL, LDL calc); Future    Screening for HIV (human immunodeficiency virus)  Discussed, ordered.  - HIV Antigen Antibody Combo; Future    Need for hepatitis C screening test  Discussed, ordered.  - Hepatitis C Screen Reflex to HCV RNA Quant and Genotype; Future    Cervical cancer screening  Patient had Pap done in 2020.  I reviewed the notes and the  "recommendation was 5 years.  She had a normal Pap as well as no positive HPV screen.  She reports no history of positive screenings, would recommend screening in .    Patient has been advised of split billing requirements and indicates understanding: Yes    COUNSELING:  Reviewed preventive health counseling, as reflected in patient instructions       Regular exercise       Healthy diet/nutrition       Family planning       Folic Acid       Safe sex practices/STD prevention       Consider Hep C screening for all patients one time for ages 18-79 years       HIV screeninx in teen years, 1x in adult years, and at intervals if high risk    BMI:   Estimated body mass index is 31.34 kg/m  as calculated from the following:    Height as of this encounter: 1.562 m (5' 1.5\").    Weight as of this encounter: 76.5 kg (168 lb 9.6 oz).   Weight management plan: Discussed healthy diet and exercise guidelines    She reports that she has never smoked. She has never used smokeless tobacco.    At the end of the visit, I confirmed understanding of what was discussed. Patient has no further questions or concerns that were brought up at this time.     Eleazar Crespo, DNP, APRN, FNP-C  "

## 2023-02-21 LAB
HCV AB SERPL QL IA: NONREACTIVE
HIV 1+2 AB+HIV1 P24 AG SERPL QL IA: NONREACTIVE

## 2023-02-24 ENCOUNTER — ALLIED HEALTH/NURSE VISIT (OUTPATIENT)
Dept: FAMILY MEDICINE | Facility: CLINIC | Age: 43
End: 2023-02-24
Payer: COMMERCIAL

## 2023-02-24 VITALS — DIASTOLIC BLOOD PRESSURE: 100 MMHG | HEART RATE: 56 BPM | SYSTOLIC BLOOD PRESSURE: 150 MMHG

## 2023-02-24 DIAGNOSIS — I10 BENIGN ESSENTIAL HYPERTENSION: Primary | ICD-10-CM

## 2023-02-24 PROCEDURE — 99207 PR NO CHARGE NURSE ONLY: CPT

## 2023-02-24 RX ORDER — AMLODIPINE BESYLATE 5 MG/1
5 TABLET ORAL DAILY
Qty: 30 TABLET | Refills: 0 | Status: SHIPPED | OUTPATIENT
Start: 2023-02-24 | End: 2023-04-10

## 2023-02-24 NOTE — PROGRESS NOTES
I met with Wonlue T Kegbeh at the request of Ora Crespo to recheck her blood pressure.  Blood pressure medications on the med list were reviewed with patient.    Patient has taken all medications as per usual regimen: Yes  Patient reports tolerating them without any issues or concerns: Yes    Vitals:    02/24/23 1256 02/24/23 1305   BP: (!) 156/94 (!) 150/100   BP Location: Right arm Right arm   Patient Position: Sitting Sitting   Cuff Size: Adult Large Adult Large   Pulse: 59 56       After 5 minutes, the patient's blood pressure remained greater than or equal to 140/90.    Is the patient currently having any chest pain? No  Does the patient currently have a headache? No  Does the patient currently have any vision changes? No  Does the patient currently have any nausea? No  Does the patient currently have any abdominal pain? No    The previous encounter was reviewed.  The patient was discharged and the note will be sent to the provider for final review.

## 2023-03-13 ENCOUNTER — OFFICE VISIT (OUTPATIENT)
Dept: FAMILY MEDICINE | Facility: CLINIC | Age: 43
End: 2023-03-13
Payer: COMMERCIAL

## 2023-03-13 ENCOUNTER — HOSPITAL ENCOUNTER (OUTPATIENT)
Dept: GENERAL RADIOLOGY | Facility: HOSPITAL | Age: 43
Discharge: HOME | End: 2023-03-13
Payer: COMMERCIAL

## 2023-03-13 VITALS
SYSTOLIC BLOOD PRESSURE: 178 MMHG | TEMPERATURE: 98.1 F | DIASTOLIC BLOOD PRESSURE: 100 MMHG | OXYGEN SATURATION: 98 % | WEIGHT: 164 LBS | HEIGHT: 62 IN | RESPIRATION RATE: 24 BRPM | HEART RATE: 60 BPM | BODY MASS INDEX: 30.18 KG/M2

## 2023-03-13 DIAGNOSIS — R05.2 SUBACUTE COUGH: ICD-10-CM

## 2023-03-13 DIAGNOSIS — M54.50 ACUTE BILATERAL LOW BACK PAIN WITHOUT SCIATICA: ICD-10-CM

## 2023-03-13 DIAGNOSIS — K62.5 RECTAL BLEEDING: ICD-10-CM

## 2023-03-13 DIAGNOSIS — R05.2 SUBACUTE COUGH: Primary | ICD-10-CM

## 2023-03-13 DIAGNOSIS — I10 BENIGN ESSENTIAL HYPERTENSION: ICD-10-CM

## 2023-03-13 DIAGNOSIS — E78.5 HYPERLIPIDEMIA LDL GOAL <100: ICD-10-CM

## 2023-03-13 DIAGNOSIS — E87.6 HYPOKALEMIA: ICD-10-CM

## 2023-03-13 LAB
ANION GAP SERPL CALCULATED.3IONS-SCNC: 10 MMOL/L (ref 7–15)
BUN SERPL-MCNC: 10 MG/DL (ref 6–20)
CALCIUM SERPL-MCNC: 9.2 MG/DL (ref 8.6–10)
CHLORIDE SERPL-SCNC: 102 MMOL/L (ref 98–107)
CREAT SERPL-MCNC: 0.88 MG/DL (ref 0.51–0.95)
DEPRECATED HCO3 PLAS-SCNC: 26 MMOL/L (ref 22–29)
ERYTHROCYTE [DISTWIDTH] IN BLOOD BY AUTOMATED COUNT: 12.7 % (ref 10–15)
FLUAV AG SPEC QL IA: NEGATIVE
FLUBV AG SPEC QL IA: NEGATIVE
GFR SERPL CREATININE-BSD FRML MDRD: 85 ML/MIN/1.73M2
GLUCOSE SERPL-MCNC: 95 MG/DL (ref 70–99)
HCT VFR BLD AUTO: 39 % (ref 35–47)
HGB BLD-MCNC: 12.8 G/DL (ref 11.7–15.7)
MCH RBC QN AUTO: 26.1 PG (ref 26.5–33)
MCHC RBC AUTO-ENTMCNC: 32.8 G/DL (ref 31.5–36.5)
MCV RBC AUTO: 80 FL (ref 78–100)
PLATELET # BLD AUTO: 207 10E3/UL (ref 150–450)
POTASSIUM SERPL-SCNC: 3.5 MMOL/L (ref 3.4–5.3)
RBC # BLD AUTO: 4.9 10E6/UL (ref 3.8–5.2)
SODIUM SERPL-SCNC: 138 MMOL/L (ref 136–145)
WBC # BLD AUTO: 6.4 10E3/UL (ref 4–11)

## 2023-03-13 PROCEDURE — 71046 X-RAY EXAM CHEST 2 VIEWS: CPT

## 2023-03-13 PROCEDURE — 99214 OFFICE O/P EST MOD 30 MIN: CPT | Mod: CS

## 2023-03-13 PROCEDURE — 87804 INFLUENZA ASSAY W/OPTIC: CPT

## 2023-03-13 PROCEDURE — 80048 BASIC METABOLIC PNL TOTAL CA: CPT

## 2023-03-13 PROCEDURE — 85027 COMPLETE CBC AUTOMATED: CPT

## 2023-03-13 PROCEDURE — 36415 COLL VENOUS BLD VENIPUNCTURE: CPT

## 2023-03-13 PROCEDURE — U0005 INFEC AGEN DETEC AMPLI PROBE: HCPCS

## 2023-03-13 PROCEDURE — U0003 INFECTIOUS AGENT DETECTION BY NUCLEIC ACID (DNA OR RNA); SEVERE ACUTE RESPIRATORY SYNDROME CORONAVIRUS 2 (SARS-COV-2) (CORONAVIRUS DISEASE [COVID-19]), AMPLIFIED PROBE TECHNIQUE, MAKING USE OF HIGH THROUGHPUT TECHNOLOGIES AS DESCRIBED BY CMS-2020-01-R: HCPCS

## 2023-03-13 RX ORDER — BENZONATATE 100 MG/1
100 CAPSULE ORAL 3 TIMES DAILY PRN
Qty: 30 CAPSULE | Refills: 0 | Status: SHIPPED | OUTPATIENT
Start: 2023-03-13 | End: 2023-04-10

## 2023-03-13 RX ORDER — ATORVASTATIN CALCIUM 40 MG/1
40 TABLET, FILM COATED ORAL DAILY
Qty: 30 TABLET | Refills: 1 | Status: SHIPPED | OUTPATIENT
Start: 2023-03-13 | End: 2023-04-10

## 2023-03-13 ASSESSMENT — ENCOUNTER SYMPTOMS
HEADACHES: 0
COUGH: 1
FATIGUE: 1
DIARRHEA: 0
PALPITATIONS: 0
MYALGIAS: 1
CHILLS: 1
ABDOMINAL PAIN: 1
FEVER: 1
BACK PAIN: 1

## 2023-03-13 ASSESSMENT — PAIN SCALES - GENERAL: PAINLEVEL: NO PAIN (0)

## 2023-03-13 NOTE — PROGRESS NOTES
"  Assessment & Plan     1. Subacute cough  Recent travel to [place], will check for COVID and influenza.  There is some rhonchi when auscultating the lungs, and it does clear with coughing.  However she was in the ER in [place], so we will get x-ray to rule out any hospital-acquired pneumonia.  She coughs frequently during exam.  If positive for COVID or the flu would not change treatment recommendations which are currently symptom management.  Will prescribe Tessalon Perles today for cough relief.  Encouraged rest, plenty of fluids, and continue to monitor with symptom management.  If symptoms persist we can reevaluate, but suspect this is a viral cough and it should continue to improve  - Symptomatic COVID-19 Virus (Coronavirus) by PCR Nose  - Influenza A & B Antigen - Clinic Collect  - XR Chest 2 Views; Future  - benzonatate (TESSALON) 100 MG capsule; Take 1 capsule (100 mg) by mouth 3 times daily as needed for cough  Dispense: 30 capsule; Refill: 0    2. Rectal bleeding  Patient reports \"large amounts\" of painless rectal bleeding for 3 to 4 days before her menses.  She just recently had this yesterday and for 2 days prior.  It goes away when she gets her menses and returns before her next period unclear etiology.  Will complete CBC today as she reports large amounts of blood loss for the last 3 days and will refer to GI.  If this happens again she certainly could seek a more immediate evaluation to assess if there is actual rectal bleeding, but patient feels it is definitely from the rectum.  - Adult GI  Referral - Consult Only; Future  - CBC with platelets    3. Hypokalemia  Incidentally found on most recent blood check.  We will recheck today.  - Basic metabolic panel  (Ca, Cl, CO2, Creat, Gluc, K, Na, BUN)    4. Hyperlipidemia LDL goal <100  Discussed most recent lab results.  LDL was over 190.  We will start with atorvastatin high-dose.  Patient is agreeable.  Discussed side effects with patient.  If " she develops any increasing muscle weakness or pain she should stop the medication and reach out.  - atorvastatin (LIPITOR) 40 MG tablet; Take 1 tablet (40 mg) by mouth daily  Dispense: 30 tablet; Refill: 1    The 10-year ASCVD risk score (Rj CARVALHO, et al., 2019) is: 2.5%    Values used to calculate the score:      Age: 42 years      Sex: Female      Is Non- : No      Diabetic: No      Tobacco smoker: No      Systolic Blood Pressure: 178 mmHg      Is BP treated: Yes      HDL Cholesterol: 59 mg/dL      Total Cholesterol: 274 mg/dL     Recent Labs   Lab Test 02/20/23  1402   CHOL 274*   HDL 59   *   TRIG 90       5. Acute bilateral low back pain without sciatica  On exam, does appear to be musculoskeletal as there is pain with palpation of the surrounding muscles in the lower back.  We will test for COVID, influenza, and give cough medicine today.  Hopefully with less coughing the back pain will lessen.  However if symptoms persist or get worse would want to see her again.  No red flags of back pain today.  No sciatica.  She can continue to use ibuprofen, or Tylenol, for pain relief.    6. Benign essential hypertension  Patient has hypertension.  We started on labetalol due to desire for pregnancy.  Blood pressure on recheck was 156/94 with heart rate in the 50s.  We stopped this and started her on amlodipine.  However she informs me today she was not able to pick this up before she went to [place].  She will pick this up today and start it.  She is having a headache, but it was treated with Tylenol and no other episodes of headaches.  No chest pain, no palpitations, no swelling in her legs, no shortness of breath when she is not coughing, and no vision changes.  We will see her again in 4 weeks to reassess blood pressure in clinic after starting the amlodipine.    Return in about 4 weeks (around 4/10/2023).    YEIMI Cameron [facility]    Noah is a 42 year old, presenting for the following health issues:  Cough    Patient presents for follow-up on blood pressure.  However she was in [place] for 2 weeks and did not  the medication he plans to pick this up today.    While she was in [place] she went to the hospital for abdominal pain, they gave her antibiotics and did not tell her anything else.  She is still having generalized abdominal pain.  No diarrhea.  She has had a fever, but attributes this to cold she has going on.  The cold started about a week ago.  She has a cough that will not go away.  She is having lower back pain with coughing.  The lower back pain is getting worse, she also has pain in her thighs.  She has no loss of bowel or bladder.  No pain radiating down into her lower legs or feet.  No joint swelling.  She is not having any difficulties with walking.  She also has a headache with a cold, she think she had a fever last night, she took a Tylenol for both the fever and headache and it improved.    She also tells me she has large amount of rectal bleeding.  It happens 3 days before her period for the last at least 3 periods.  It is painless, no recent rectal intercourse, no trauma, no hemorrhoids that she is aware of.  This happened for the last 3 days and today she has her period.    She is wanting to get pregnant.  Her  lives in [place] and she was just recently there.  She is not on a prenatal vitamin yet, but will start 1.  She is 42 years old so would recommend she see OB/GYN prior.  We will try and manage her blood pressure, cholesterol, and other comorbidities to optimal levels prior to conceiving.  I did discuss with her today that she is considered a high risk pregnancy because of her age.       History of Present Illness       Back Pain:  She presents for follow up of back pain. Patient's back pain is a new problem.    Original cause of back pain: not sure  First noticed back pain: in the last week  Patient  "feels back pain: dailyLocation of back pain:  Right lower back, left lower back and right middle of back  Description of back pain: sharp  Back pain spreads: right thigh and left thigh    Since patient first noticed back pain, pain is: rapidly worsening  Does back pain interfere with her job:  No  On a scale of 1-10 (10 being the worst), patient describes pain as:  6  What makes back pain worse: coughing  Acupuncture: not tried  Acetaminophen: helpful  Activity or exercise: not tried  Chiropractor:  Not tried  Cold: not helpful  Heat: not tried  Massage: not tried  Muscle relaxants: not tried  NSAIDS: not tried  Opioids: not tried  Physical Therapy: not tried  Rest: helpful  Steroid Injection: not tried  Stretching: not tried  Surgery: not tried  TENS unit: not tried  Topical pain relievers: helpful  Other healthcare providers patient is seeing for back pain: None    Headaches:   Since the patient's last clinic visit, headaches are: no change  The patient is getting headaches:  Not so offter  She is not able to do normal daily activities when she has a migraine.  The patient is taking the following rescue/relief medications:  Tylenol   Patient states \"I get some relief\" from the rescue/relief medications.   The patient is taking the following medications to prevent migraines:  No medications to prevent migraines  In the past 4 weeks, the patient has gone to an Urgent Care or Emergency Room 0 times times due to headaches.    She eats 2-3 servings of fruits and vegetables daily.She consumes 2 sweetened beverage(s) daily.She exercises with enough effort to increase her heart rate 9 or less minutes per day.  She exercises with enough effort to increase her heart rate 3 or less days per week.   She is taking medications regularly.     Acute Illness  Acute illness concerns: Cough  Onset/Duration: 3/6  Symptoms:  Fever: YES  Chills/Sweats: YES  Headache (location?): YES  Sinus Pressure: No  Conjunctivitis:  No  Ear Pain: " "no  Rhinorrhea: No  Congestion: YES  Sore Throat: YES  Cough: YES-non-productive  Wheeze: YES  Decreased Appetite: No  Nausea: YES- only one time  Vomiting: YES  Diarrhea: No  Dysuria/Freq.: No  Dysuria or Hematuria: No  Fatigue/Achiness: YES  Sick/Strep Exposure: No  Therapies tried and outcome: tylenol extra strength; abx from [facility]    Review of Systems   Constitutional: Positive for chills, fatigue and fever.   Respiratory: Positive for cough.    Cardiovascular: Negative for chest pain and palpitations.   Gastrointestinal: Positive for abdominal pain. Negative for diarrhea.   Genitourinary:        On her menses   Musculoskeletal: Positive for back pain and myalgias.   Neurological: Negative for headaches.          Objective    BP (!) 178/100 (BP Location: Right arm, Patient Position: Sitting, Cuff Size: Adult Regular)   Pulse 60   Temp 98.1  F (36.7  C) (Oral)   Resp 24   Ht 1.562 m (5' 1.5\")   Wt 74.4 kg (164 lb)   LMP  (LMP Unknown)   SpO2 98%   BMI 30.49 kg/m    Body mass index is 30.49 kg/m .  Physical Exam  Constitutional:       General: She is not in acute distress.  HENT:      Head: Normocephalic.   Eyes:      Extraocular Movements: Extraocular movements intact.   Cardiovascular:      Rate and Rhythm: Normal rate and regular rhythm.      Heart sounds: Normal heart sounds. No murmur heard.  Pulmonary:      Effort: Pulmonary effort is normal. No respiratory distress.      Breath sounds: Examination of the right-upper field reveals rhonchi. Examination of the left-upper field reveals rhonchi. Examination of the right-middle field reveals rhonchi. Rhonchi present.   Musculoskeletal:      Thoracic back: Tenderness present. No bony tenderness.      Lumbar back: Tenderness present. No swelling or bony tenderness. Normal range of motion.   Skin:     General: Skin is warm and dry.   Neurological:      General: No focal deficit present.      Mental Status: She is alert. Mental status is at baseline. "   Psychiatric:         Mood and Affect: Mood normal.         Thought Content: Thought content normal.     At the end of the visit, I confirmed understanding of what was discussed. Patient has no further questions or concerns that were brought up at this time.     Eleazar Crespo, SARAH, APRN, FNP-C

## 2023-03-13 NOTE — LETTER
March 15, 2023      Wonlue T Kegbeh  [facility]     [place] [address]        Dear Ms.Kegbeh,    Your blood work looks good. Your blood counts are normal, electrolytes and kidney function look good. COVID and influenza were negative. You likely have symptoms from another virus. Please continue to rest, drink plenty of fluids, and treat your symptoms with over-the-counter medications as needed. Your chest x-ray was normal with no signs of pneumonia.     Please let me know if you have any questions. I will see you in a few weeks for a blood pressure recheck. Please make sure you are taking your amlodipine everyday.     Eleazar Crespo DNP, APRN, FNP-C       Resulted Orders   Symptomatic COVID-19 Virus (Coronavirus) by PCR Nose   Result Value Ref Range    SARS CoV2 PCR Negative Negative      Comment:      NEGATIVE: SARS-CoV-2 (COVID-19) RNA not detected, presumed negative.    Narrative    Testing was performed using the Aptima SARS-CoV-2 Assay on the  Jet[employer] Instrument System. Additional information about this  Emergency Use Authorization (EUA) assay can be found via the Lab  Guide. This test should be ordered for the detection of SARS-CoV-2 in  individuals who meet SARS-CoV-2 clinical and/or epidemiological  criteria. Test performance is unknown in asymptomatic patients. This  test is for in vitro diagnostic use under the FDA EUA for  laboratories certified under CLIA to perform high complexity testing.  This test has not been FDA cleared or approved. A negative result  does not rule out the presence of PCR inhibitors in the specimen or  target RNA in concentration below the limit of detection for the  assay. The possibility of a false negative should be considered if  the patient's recent exposure or clinical presentation suggests  COVID-19. This test was validated by the [facility]. This laboratory is certified under  the Clinical Laboratory  Improvement Amendments of 1988 (CLIA-88) as  qualified to perform high complexity laboratory testing.   Influenza A & B Antigen - Clinic Collect   Result Value Ref Range    Influenza A antigen Negative Negative    Influenza B antigen Negative Negative    Narrative    Test results must be correlated with clinical data. If necessary, results should be confirmed by a molecular assay or viral culture.   CBC with platelets   Result Value Ref Range    WBC Count 6.4 4.0 - 11.0 10e3/uL    RBC Count 4.90 3.80 - 5.20 10e6/uL    Hemoglobin 12.8 11.7 - 15.7 g/dL    Hematocrit 39.0 35.0 - 47.0 %    MCV 80 78 - 100 fL    MCH 26.1 (L) 26.5 - 33.0 pg    MCHC 32.8 31.5 - 36.5 g/dL    RDW 12.7 10.0 - 15.0 %    Platelet Count 207 150 - 450 10e3/uL   Basic metabolic panel  (Ca, Cl, CO2, Creat, Gluc, K, Na, BUN)   Result Value Ref Range    Sodium 138 136 - 145 mmol/L    Potassium 3.5 3.4 - 5.3 mmol/L    Chloride 102 98 - 107 mmol/L    Carbon Dioxide (CO2) 26 22 - 29 mmol/L    Anion Gap 10 7 - 15 mmol/L    Urea Nitrogen 10.0 6.0 - 20.0 mg/dL    Creatinine 0.88 0.51 - 0.95 mg/dL    Calcium 9.2 8.6 - 10.0 mg/dL    Glucose 95 70 - 99 mg/dL    GFR Estimate 85 >60 mL/min/1.73m2      Comment:      eGFR calculated using 2021 CKD-EPI equation.       If you have any questions or concerns, please call the clinic at the number listed above.       Sincerely,      YEIMI Jorgensen CNP

## 2023-03-14 LAB — SARS-COV-2 RNA RESP QL NAA+PROBE: NEGATIVE

## 2023-03-15 ENCOUNTER — TELEPHONE (OUTPATIENT)
Dept: FAMILY MEDICINE | Facility: CLINIC | Age: 43
End: 2023-03-15
Payer: COMMERCIAL

## 2023-03-15 ENCOUNTER — TELEPHONE (OUTPATIENT)
Dept: NURSING | Facility: CLINIC | Age: 43
End: 2023-03-15
Payer: COMMERCIAL

## 2023-03-15 ENCOUNTER — NURSE TRIAGE (OUTPATIENT)
Dept: NURSING | Facility: CLINIC | Age: 43
End: 2023-03-15
Payer: COMMERCIAL

## 2023-03-15 NOTE — TELEPHONE ENCOUNTER
Patient informed of PCP's recommendations below.     Patient verbalized understanding, no further questions.

## 2023-03-15 NOTE — TELEPHONE ENCOUNTER
Called patient and left a generic message for her to return phone call. Patient needs to be informed of the results/message below. I sent a results letter today.

## 2023-03-15 NOTE — TELEPHONE ENCOUNTER
----- Message from YEIMI Jorgensen CNP sent at 3/15/2023  7:44 AM CDT -----  Please call patient and let her know that her blood work looks good. Her blood counts are normal, her electrolytes and kidneys function look good. COVID and influenza were negative. You likely have symptoms from another virus. Please continue to rest, drink plenty of fluids, and treat your symptoms with over-the-counter medications as needed. Your chest x-ray was normal with no signs of pneumonia.     Please let me know if you have any questions. I will see you in a few weeks for a blood pressure recheck. Please make sure you are taking your amlodipine everyday.     Eleazar Crespo DNP, YEIMI, FNP-C

## 2023-03-15 NOTE — TELEPHONE ENCOUNTER
Telephone call:    Patient calling back after receiving a VM left on phone.  Writer did read last encounter to patient.  Patient verbalized understanding.  Also, patient is requesting a muscle relaxer for her body aches.  Writer encouraged patient to take tylenol for any general body aches, but patient reports that it is not effective.      Please review & advise patient w/ further recommendations.    Celeste Cm RN on 3/15/2023 at 12:38 PM    Reason for Disposition    Information only question and nurse able to answer    Protocols used: INFORMATION ONLY CALL - NO TRIAGE-A-OH

## 2023-03-15 NOTE — TELEPHONE ENCOUNTER
Typically I would recommend over the counter NSAIDs (ibuprofen) at a high dose for body aches, however her blood pressure has been too elevated to take this. I recommend she go to the pharmacy and ask for cold/flu medicine that is safe for high blood pressures. They have Coricidin (brand name) formulations that specifically are for body aches and pains when we are sick.

## 2023-03-16 ENCOUNTER — TRANSCRIBE ORDERS (OUTPATIENT)
Dept: FAMILY MEDICINE | Facility: CLINIC | Age: 43
End: 2023-03-16
Payer: COMMERCIAL

## 2023-03-16 DIAGNOSIS — K62.5 RECTAL BLEEDING: Primary | ICD-10-CM

## 2023-03-21 ENCOUNTER — TELEPHONE (OUTPATIENT)
Dept: SURGERY | Facility: CLINIC | Age: 43
End: 2023-03-21
Payer: COMMERCIAL

## 2023-03-21 NOTE — TELEPHONE ENCOUNTER
[facility] Call Center    Phone Message    May a detailed message be left on voicemail: yes     Reason for Call: Other: New patient // DX Rectal Bleeding // appointment scheduled first avialable in June     Action Taken: Message routed to:  [facility]ILO    Travel Screening: Not Applicable

## 2023-03-22 NOTE — TELEPHONE ENCOUNTER
Diagnosis, Referred by & from: Rectal Bleeding; referred by Ora Crespo NP   Appt date: 6/12/2023   NOTES STATUS DETAILS   OFFICE NOTE from referring provider [facility]:  4/10/23, 3/13/23 - PCC OV with Ora Crespo NP   OFFICE NOTE from other specialist [facility]:  5/2/23 - OBGYN OV with Dr. Santos   DISCHARGE SUMMARY from hospital N/A    DISCHARGE REPORT from the ER N/A    OPERATIVE REPORT N/A    MEDICATION LIST Internal    LABS N/A    DIAGNOSTIC PROCEDURES N/A    IMAGING (DISC & REPORT) N/A

## 2023-04-10 ENCOUNTER — OFFICE VISIT (OUTPATIENT)
Dept: FAMILY MEDICINE | Facility: CLINIC | Age: 43
End: 2023-04-10
Payer: COMMERCIAL

## 2023-04-10 VITALS — TEMPERATURE: 98 F | BODY MASS INDEX: 30.51 KG/M2 | RESPIRATION RATE: 16 BRPM | HEIGHT: 62 IN | WEIGHT: 165.8 LBS

## 2023-04-10 DIAGNOSIS — I10 BENIGN ESSENTIAL HYPERTENSION: Primary | ICD-10-CM

## 2023-04-10 DIAGNOSIS — E78.5 HYPERLIPIDEMIA LDL GOAL <100: ICD-10-CM

## 2023-04-10 DIAGNOSIS — R07.9 CHEST PAIN, UNSPECIFIED TYPE: ICD-10-CM

## 2023-04-10 LAB
ATRIAL RATE - MUSE: 54 BPM
DIASTOLIC BLOOD PRESSURE - MUSE: NORMAL MMHG
INTERPRETATION ECG - MUSE: NORMAL
P AXIS - MUSE: 24 DEGREES
PR INTERVAL - MUSE: 186 MS
QRS DURATION - MUSE: 80 MS
QT - MUSE: 426 MS
QTC - MUSE: 403 MS
R AXIS - MUSE: -5 DEGREES
SYSTOLIC BLOOD PRESSURE - MUSE: NORMAL MMHG
T AXIS - MUSE: 4 DEGREES
VENTRICULAR RATE- MUSE: 54 BPM

## 2023-04-10 PROCEDURE — 93005 ELECTROCARDIOGRAM TRACING: CPT

## 2023-04-10 PROCEDURE — 99213 OFFICE O/P EST LOW 20 MIN: CPT

## 2023-04-10 PROCEDURE — 93010 ELECTROCARDIOGRAM REPORT: CPT | Performed by: INTERNAL MEDICINE

## 2023-04-10 RX ORDER — ATORVASTATIN CALCIUM 40 MG/1
40 TABLET, FILM COATED ORAL DAILY
Qty: 90 TABLET | Refills: 1 | Status: SHIPPED | OUTPATIENT
Start: 2023-04-10

## 2023-04-10 RX ORDER — AMLODIPINE BESYLATE 5 MG/1
5 TABLET ORAL DAILY
Qty: 90 TABLET | Refills: 1 | Status: SHIPPED | OUTPATIENT
Start: 2023-04-10 | End: 2023-10-09

## 2023-04-10 ASSESSMENT — PAIN SCALES - GENERAL: PAINLEVEL: NO PAIN (0)

## 2023-04-10 NOTE — PROGRESS NOTES
Assessment & Plan     Benign essential hypertension  Chronic, we have currently been trying to manage this.  She had forgot to fill it initially and came insomnia without being on it, and today she reports she did not take it this morning.  We will continue the amlodipine 5 mg and get a blood pressure measurement after she takes the medication in a few weeks.  I emphasized the importance of taking this every day and that this will be a chronic medication for her. Discussed DASH diet and dietary sodium restrictions.  Continue/Increase dietary efforts and physical activity.  - amLODIPine (NORVASC) 5 MG tablet; Take 1 tablet (5 mg) by mouth daily  - EKG 12-lead, tracing only    Hyperlipidemia LDL goal <100  ASCVD risk score 1.2%, but LDL above 190.  Discussed starting therapy for this with patient, and she agrees.  We will start on Lipitor 40 mg daily.  Educated patient on let me know if there is any increased muscle weakness or pain.   - atorvastatin (LIPITOR) 40 MG tablet; Take 1 tablet (40 mg) by mouth daily    Recent Labs   Lab Test 02/20/23  1402   CHOL 274*   HDL 59   *   TRIG 90     The 10-year ASCVD risk score (Rj CARVALHO, et al., 2019) is: 1.2%    Values used to calculate the score:      Age: 42 years      Sex: Female      Is Non- : No      Diabetic: No      Tobacco smoker: No      Systolic Blood Pressure: 124 mmHg      Is BP treated: Yes      HDL Cholesterol: 59 mg/dL      Total Cholesterol: 274 mg/dL    Chest pain, unspecified type  Suspect intermittent chest pain could be from elevated blood pressure as today without medication she is significantly hypertensive at 178/100.  EKG to rule out any ACS, reveals sinus bradycardia with possible left atrial enlargement with nonspecific T wave abnormality. Patient is to follow-up in a few weeks for BP recheck and we will get a repeat EKG as the image was poor quality.  Advised patient that if she has chest pain before then she should  be evaluated more urgently or emergently.  Suspect left atrial enlargement could be from chronic high blood pressure that was untreated prior to now. If repeat EKG has similar changes and patient still having chest pain will complete stress testing/cardiology referral.  No evidence of ACS at today's visit.  - EKG 12-lead, tracing only    Subjective   Noah is a 42 year old, presenting for the following health issues:  Blood Pressure Check (BP check)    Patient presents for blood pressure recheck.  She has been started on medications and had been changed a few times in the past.  She has been remembering to take her medication daily.  She did not take her medications today.  She is taking 5 mg of Norvasc.  She has not had any headaches, swelling in her legs, shortness of breath, heart palpitations, or vision changes.  She does report that she sometimes she has a little sharp pain in her chest that happens occasionally.  The pain lasts for just a few seconds.  She does not think it is related to anxiety.  It is not made worse  with walking or activity.  The pain does not radiate anywhere.  She also reports dizziness.  This does not happen a lot, but is getting more prevalent.  She does not note it with position changes, she does not note it when she is in bed.  She feels more like she is spinning rather than the room around her.        4/10/2023    10:28 AM   Additional Questions   Roomed by Susana saucedo CMA     History of Present Illness       Hypertension: She presents for follow up of hypertension.  She does not check blood pressure  regularly outside of the clinic. Outside blood pressures have been over 140/90. She does not follow a low salt diet.     She eats 0-1 servings of fruits and vegetables daily.She consumes 1 sweetened beverage(s) daily.She exercises with enough effort to increase her heart rate 9 or less minutes per day.  She exercises with enough effort to increase her heart rate 3 or less days per week.  "She is missing 1 dose(s) of medications per week.  She is not taking prescribed medications regularly due to remembering to take.     Review of Systems   Constitutional: Negative for fatigue and unexpected weight change.   Eyes: Negative for visual disturbance.   Respiratory: Negative for shortness of breath.    Cardiovascular: Positive for chest pain. Negative for palpitations and peripheral edema.   Neurological: Positive for dizziness. Negative for headaches.         Objective    LMP  (LMP Unknown)   Body mass index is 30.33 kg/m .   Temp 98  F (36.7  C) (Temporal)   Resp 16   Ht 1.575 m (5' 2\")   Wt 75.2 kg (165 lb 12.8 oz)   LMP  (LMP Unknown)   BMI 30.33 kg/m      Current Outpatient Medications   Medication     amLODIPine (NORVASC) 5 MG tablet     atorvastatin (LIPITOR) 40 MG tablet     Prenatal Vit-Fe Fumarate-FA (PRENATAL MULTIVITAMIN W/IRON) 27-0.8 MG tablet     No current facility-administered medications for this visit.     Physical Exam  Constitutional:       General: She is not in acute distress.  Cardiovascular:      Rate and Rhythm: Normal rate and regular rhythm.      Pulses: Normal pulses.      Heart sounds: Normal heart sounds. No murmur heard.  Pulmonary:      Effort: Pulmonary effort is normal. No respiratory distress.      Breath sounds: Normal breath sounds. No wheezing or rhonchi.   Musculoskeletal:      Right lower leg: Edema present.      Left lower leg: Edema present.      Comments: Non-pitting edema to BLE   Neurological:      General: No focal deficit present.      Mental Status: She is alert. Mental status is at baseline.   Psychiatric:         Mood and Affect: Mood normal.         Thought Content: Thought content normal.        At the end of the visit, I confirmed understanding of what was discussed. Patient has no further questions or concerns that were brought up at this time.     Eleazar Crespo, DNP, APRN, FNP-C            "

## 2023-04-25 ASSESSMENT — ENCOUNTER SYMPTOMS
SHORTNESS OF BREATH: 0
PALPITATIONS: 0
HEADACHES: 0
DIZZINESS: 1
FATIGUE: 0
UNEXPECTED WEIGHT CHANGE: 0

## 2023-05-02 ENCOUNTER — OFFICE VISIT (OUTPATIENT)
Dept: OBGYN | Facility: CLINIC | Age: 43
End: 2023-05-02
Payer: COMMERCIAL

## 2023-05-02 VITALS
WEIGHT: 165 LBS | SYSTOLIC BLOOD PRESSURE: 128 MMHG | BODY MASS INDEX: 30.36 KG/M2 | DIASTOLIC BLOOD PRESSURE: 76 MMHG | HEIGHT: 62 IN

## 2023-05-02 DIAGNOSIS — Z31.49 PROCREATION MANAGEMENT INVESTIGATION AND TESTING: Primary | ICD-10-CM

## 2023-05-02 LAB
ESTRADIOL SERPL-MCNC: 36 PG/ML
FSH SERPL IRP2-ACNC: 10.2 MIU/ML
MIS SERPL-MCNC: 0.48 NG/ML (ref 0.03–5.5)
PROLACTIN SERPL 3RD IS-MCNC: 17 NG/ML (ref 5–23)
TSH SERPL DL<=0.005 MIU/L-ACNC: 3.35 UIU/ML (ref 0.3–4.2)

## 2023-05-02 PROCEDURE — 36415 COLL VENOUS BLD VENIPUNCTURE: CPT | Performed by: OBSTETRICS & GYNECOLOGY

## 2023-05-02 PROCEDURE — 84146 ASSAY OF PROLACTIN: CPT | Performed by: OBSTETRICS & GYNECOLOGY

## 2023-05-02 PROCEDURE — 84443 ASSAY THYROID STIM HORMONE: CPT | Performed by: OBSTETRICS & GYNECOLOGY

## 2023-05-02 PROCEDURE — 99202 OFFICE O/P NEW SF 15 MIN: CPT | Performed by: OBSTETRICS & GYNECOLOGY

## 2023-05-02 PROCEDURE — 83520 IMMUNOASSAY QUANT NOS NONAB: CPT | Performed by: OBSTETRICS & GYNECOLOGY

## 2023-05-02 PROCEDURE — 83001 ASSAY OF GONADOTROPIN (FSH): CPT | Performed by: OBSTETRICS & GYNECOLOGY

## 2023-05-02 PROCEDURE — 82670 ASSAY OF TOTAL ESTRADIOL: CPT | Performed by: OBSTETRICS & GYNECOLOGY

## 2023-05-02 NOTE — PROGRESS NOTES
CC: Wonlue T Kegbeh is here secondary to a desire for pregnancy.    HPI: The pt is a 42 year old SAAF P0 who presents with wanting to conceive.  [place], but the plan is for him to immigrate to the [place].  Last year she visited him twice; she has been there once so far this year.  Her periods are monthly lasting 3-4 days.  She gets some PMS symptoms starting about a week before her period.  Her last Depo shot was in June of 2021.  She hasn't used birth control since.  She has HTN, initially treated with labetalol but with side effects, so she was changed to amlodipine.  When she remembers to take it, she has control of her blood pressure.  She denies any pelvic surgery or infection in the past.  Her partner is 39 years old.  He has not fathered any children.  He doesn't have any medical issues, doesn't take any medications, and doesn't have a history of genital surgery.    Past Medical History:   Diagnosis Date     Essential hypertension      High cholesterol        Past Surgical History:   Procedure Laterality Date     NO PAST SURGERIES         Patient's   Family History   Problem Relation Age of Onset     No Known Problems Mother      No Known Problems Father      No Known Problems Sister      No Known Problems Brother      No Known Problems Brother      No Known Problems Brother      No Known Problems Brother        Patient   Social History     Socioeconomic History     Marital status: Single     Spouse name: None     Number of children: None     Years of education: None     Highest education level: None   Tobacco Use     Smoking status: Never     Passive exposure: Never     Smokeless tobacco: Never   Vaping Use     Vaping status: Never Used   Substance and Sexual Activity     Alcohol use: Never     Drug use: Never     Sexual activity: Yes       Outpatient Medications Prior to Visit   Medication Sig Dispense Refill     amLODIPine (NORVASC) 5 MG tablet Take 1 tablet (5 mg) by mouth daily 90 tablet 1  "    atorvastatin (LIPITOR) 40 MG tablet Take 1 tablet (40 mg) by mouth daily 90 tablet 1     Prenatal Vit-Fe Fumarate-FA (PRENATAL MULTIVITAMIN W/IRON) 27-0.8 MG tablet Take 1 tablet by mouth daily (Patient not taking: Reported on 3/13/2023) 90 tablet 3     No facility-administered medications prior to visit.       Patient has No Known Allergies.    ROS:  12 part ROS is negative aside from those symptoms in the HPI    PE:  /76 (BP Location: Right arm, Patient Position: Sitting, Cuff Size: Adult Regular)   Ht 1.575 m (5' 2\")   Wt 74.8 kg (165 lb)   LMP 04/30/2023 (Exact Date)           Body mass index is 30.18 kg/m .    General: obese AAF, NAD  Psych: normal mood  Neuro: CN I-XII grossly intact  MS: normal gait    Assessment: 42 year old SLiberianF P0 with a desire for pregnancy.    Plan: Natural history of fertility discussed with the patient.  We discussed fertility and how it changes over time for women.  Lab testing was ordered.  I did recommend that her partner get a semen analysis if possible in [place] to make sure that is normal.  We discussed ovulation timing with timing of intercourse depending on when she is able to travel to [place] next.  She will be contacted after the labs are back to determine next steps.  Questions were answered to the best of my ability.    27 minutes spent on the date of the encounter doing chart review, patient visit and documentation   "

## 2023-05-10 ENCOUNTER — OFFICE VISIT (OUTPATIENT)
Dept: FAMILY MEDICINE | Facility: CLINIC | Age: 43
End: 2023-05-10
Payer: COMMERCIAL

## 2023-05-10 VITALS
SYSTOLIC BLOOD PRESSURE: 133 MMHG | BODY MASS INDEX: 30.18 KG/M2 | TEMPERATURE: 98.5 F | DIASTOLIC BLOOD PRESSURE: 79 MMHG | OXYGEN SATURATION: 98 % | HEART RATE: 57 BPM | WEIGHT: 165 LBS | RESPIRATION RATE: 16 BRPM

## 2023-05-10 DIAGNOSIS — R21 RASH OF FACE: Primary | ICD-10-CM

## 2023-05-10 PROCEDURE — 99213 OFFICE O/P EST LOW 20 MIN: CPT | Performed by: FAMILY MEDICINE

## 2023-05-10 RX ORDER — TRIAMCINOLONE ACETONIDE 1 MG/G
CREAM TOPICAL 2 TIMES DAILY
Qty: 30 G | Refills: 1 | Status: SHIPPED | OUTPATIENT
Start: 2023-05-10

## 2023-05-10 NOTE — PROGRESS NOTES
Assessment:       Rash of face  - triamcinolone (KENALOG) 0.1 % external cream  Dispense: 30 g; Refill: 1         Plan:     Rash has appearance of some type of dermatitis.  We use a topical triamcinolone cream to see if this helps with symptoms.  Unknown etiology.  With PCP if symptoms getting worse or not improving over the next 1 to 2 weeks.    MEDICATIONS:   Orders Placed This Encounter   Medications     triamcinolone (KENALOG) 0.1 % external cream     Sig: Apply topically 2 times daily Do not use longer than 14 days     Dispense:  30 g     Refill:  1         Subjective:       42 year old female presents for evaluation of a 2 to 3-week history of an itchy irritated bumpy rash on her face, mostly on her forehead.  She has had no known new cleansers, lotions, sunscreens, moisturizers, or any other known exposure.  No rash anywhere else.    Patient Active Problem List   Diagnosis     Borderline hypertension     Female infertility     Mantoux: positive       Past Medical History:   Diagnosis Date     Essential hypertension      High cholesterol        Past Surgical History:   Procedure Laterality Date     NO PAST SURGERIES         Current Outpatient Medications   Medication     amLODIPine (NORVASC) 5 MG tablet     atorvastatin (LIPITOR) 40 MG tablet     triamcinolone (KENALOG) 0.1 % external cream     Prenatal Vit-Fe Fumarate-FA (PRENATAL MULTIVITAMIN W/IRON) 27-0.8 MG tablet     No current facility-administered medications for this visit.       No Known Allergies    Family History   Problem Relation Age of Onset     No Known Problems Mother      No Known Problems Father      No Known Problems Sister      No Known Problems Brother      No Known Problems Brother      No Known Problems Brother      No Known Problems Brother        Social History     Socioeconomic History     Marital status: Single     Spouse name: None     Number of children: None     Years of education: None     Highest education level: None   Tobacco  Use     Smoking status: Never     Passive exposure: Never     Smokeless tobacco: Never   Vaping Use     Vaping status: Never Used   Substance and Sexual Activity     Alcohol use: Never     Drug use: Never     Sexual activity: Yes         Review of Systems  Pertinent items are noted in HPI.      Objective:     /79   Pulse 57   Temp 98.5  F (36.9  C) (Oral)   Resp 16   Wt 74.8 kg (165 lb)   LMP 04/30/2023 (Exact Date)   SpO2 98%   BMI 30.18 kg/m       General appearance: alert, appears stated age and cooperative  Skin: Patient with raised papular mildly erythematous rash on her forehead.  It is confluent.  No rash anywhere else.       This note has been dictated using voice recognition software. Any grammatical or context distortions are unintentional and inherent to the software

## 2023-05-12 ENCOUNTER — TELEPHONE (OUTPATIENT)
Dept: OBGYN | Facility: CLINIC | Age: 43
End: 2023-05-12
Payer: COMMERCIAL

## 2023-06-05 ENCOUNTER — OFFICE VISIT (OUTPATIENT)
Dept: OPHTHALMOLOGY | Facility: CLINIC | Age: 43
End: 2023-06-05
Payer: COMMERCIAL

## 2023-06-05 DIAGNOSIS — H52.4 PRESBYOPIA: ICD-10-CM

## 2023-06-05 DIAGNOSIS — Z01.00 EXAMINATION OF EYES AND VISION: Primary | ICD-10-CM

## 2023-06-05 PROCEDURE — 92004 COMPRE OPH EXAM NEW PT 1/>: CPT | Performed by: OPHTHALMOLOGY

## 2023-06-05 PROCEDURE — 92015 DETERMINE REFRACTIVE STATE: CPT | Performed by: OPHTHALMOLOGY

## 2023-06-05 ASSESSMENT — VISUAL ACUITY
OS_CC+: -1
OS_CC: 20/30
OS_PH_CC: 20/25
OD_CC: 20/25
METHOD: SNELLEN - LINEAR
CORRECTION_TYPE: GLASSES
OS_PH_CC+: -1
OD_CC+: -2

## 2023-06-05 ASSESSMENT — REFRACTION_MANIFEST
OD_AXIS: 035
OD_ADD: +1.75
OS_CYLINDER: +0.50
OS_AXIS: 145
OS_ADD: +1.75
OD_SPHERE: -0.25
OD_CYLINDER: +0.50
OS_SPHERE: -0.50

## 2023-06-05 ASSESSMENT — REFRACTION_WEARINGRX
OD_AXIS: 028
OS_SPHERE: PLANO
OD_CYLINDER: +0.25
OS_ADD: +1.25
OD_SPHERE: PLANO
OD_ADD: +1.25
SPECS_TYPE: PAL
OS_CYLINDER: +0.25
OS_AXIS: 104

## 2023-06-05 ASSESSMENT — CONF VISUAL FIELD
OS_INFERIOR_TEMPORAL_RESTRICTION: 0
OS_SUPERIOR_NASAL_RESTRICTION: 0
OD_INFERIOR_NASAL_RESTRICTION: 0
OS_NORMAL: 1
OD_SUPERIOR_TEMPORAL_RESTRICTION: 0
OD_INFERIOR_TEMPORAL_RESTRICTION: 0
OS_INFERIOR_NASAL_RESTRICTION: 0
OD_NORMAL: 1
OS_SUPERIOR_TEMPORAL_RESTRICTION: 0
OD_SUPERIOR_NASAL_RESTRICTION: 0

## 2023-06-05 ASSESSMENT — TONOMETRY
OS_IOP_MMHG: 18
OD_IOP_MMHG: 18
IOP_METHOD: APPLANATION

## 2023-06-05 NOTE — PATIENT INSTRUCTIONS
"Glasses prescription given - optional  May use artificial tears up to four times a day (like Refresh Optive, Systane Balance, TheraTears, or generic artificial tears are ok. Avoid \"get the red out\" drops).   Could try a warm pack on your lids for 10 minutes once or twice daily.  Could try OTC allergy drop, either Zaditor twice daily or Pataday once daily if desire  Call in February 2025 for an appointment in June 2025 for Complete Exam    Dr. Coats [phone]    "

## 2023-06-05 NOTE — LETTER
"    6/5/2023         RE: Wonlue T Kegbeh  [address]  [place] [address]        Dear Colleague,    Thank you for referring your patient, Wonlue T Kegbeh, to the [facility]. Please see a copy of my visit note below.     Current Eye Medications: Systane daily both eyes     Subjective:  Here for complete eye exam today. Having sharp pains sometimes in the eyes and also watery both eyes. Light sensitivity off and on     Objective:  See Ophthalmology Exam.       Assessment:  Baseline eye exam.      ICD-10-CM    1. Examination of eyes and vision  Z01.00       2. Presbyopia  H52.4            Plan:  Glasses prescription given - optional  May use artificial tears up to four times a day (like Refresh Optive, Systane Balance, TheraTears, or generic artificial tears are ok. Avoid \"get the red out\" drops).   Could try a warm pack on your lids for 10 minutes once or twice daily.  Could try OTC allergy drop, either Zaditor twice daily or Pataday once daily if desire  Call in February 2025 for an appointment in June 2025 for Complete Exam    Dr. Coats [phone]           Again, thank you for allowing me to participate in the care of your patient.        Sincerely,        Moose Coats MD    "

## 2023-06-12 ENCOUNTER — OFFICE VISIT (OUTPATIENT)
Dept: SURGERY | Facility: CLINIC | Age: 43
End: 2023-06-12
Payer: COMMERCIAL

## 2023-06-12 ENCOUNTER — PRE VISIT (OUTPATIENT)
Dept: SURGERY | Facility: CLINIC | Age: 43
End: 2023-06-12

## 2023-06-12 VITALS — SYSTOLIC BLOOD PRESSURE: 138 MMHG | DIASTOLIC BLOOD PRESSURE: 94 MMHG | OXYGEN SATURATION: 100 % | HEART RATE: 83 BPM

## 2023-06-12 DIAGNOSIS — K64.8 INTERNAL HEMORRHOID: Primary | ICD-10-CM

## 2023-06-12 DIAGNOSIS — K62.5 RECTAL BLEEDING: ICD-10-CM

## 2023-06-12 PROCEDURE — 99202 OFFICE O/P NEW SF 15 MIN: CPT | Mod: 25

## 2023-06-12 PROCEDURE — 46221 LIGATION OF HEMORRHOID(S): CPT

## 2023-06-12 ASSESSMENT — PAIN SCALES - GENERAL: PAINLEVEL: NO PAIN (0)

## 2023-06-12 NOTE — PATIENT INSTRUCTIONS
You had hemorrhoid banding done today.    Stay off aspirin and other blood thinners for 2 weeks.  Avoid straining. We recommend taking a fiber supplement (Metamucil or Citrucel) once a day, and you can add MiraLAX if needed.  Discomfort (feeling like you need to have a bowel movement) is to be expected. This will improve on its own over 1-2 days.  Mild bleeding can also be expected. You may also notice more bleeding in 1-2 weeks when the rubber band falls off.   Contact our clinic or go to the Emergency Department if you have significant bleeding (such as a large amount of blood dripping down your leg), difficulty urinating, fever, or significant pain.  If your hemorrhoid symptoms persist, you may return to clinic in/after 6 weeks for repeat banding. You do not need banding if you do not have symptoms.

## 2023-06-12 NOTE — PROGRESS NOTES
"Colon and Rectal Surgery Consult Clinic Note    Date: 2023     Referring provider:  YEIMI Castro CNP  [address] [place] [address]     RE: Wonlue T Kegbeh  : 1980  NICHOLE: 2023    Wonlue T Kegbeh is a very pleasant 42 year old female here with concerns for rectal bleeding.    Noah reports about 1 year of rectal bleeding that occurs in the days leading up to her menses. This bleeding occurs outside of bowel movements and she has to wear a pad. She is certain that this is not vaginal bleeding as the blood is further back on the pad. Occasional pain with bowel movements. She last had bleeding last month before her last period. She describes her bowel movements as \"normal\" once every other day. Her stools are usually formed, sometimes pellet-like.     No prior colonoscopy. No family history of colorectal cancer.    Physical Examination:  BP (!) 138/94 (BP Location: Right arm, Patient Position: Sitting, Cuff Size: Adult Large)   Pulse 83   LMP 2023 (Exact Date)   SpO2 100%   General: alert, oriented, in no acute distress, sitting comfortably  HEENT: moist mucous membranes    Perianal external examination: Exam was chaperoned by Victor Manuel Sweet EMT-P   Perianal skin: Intact with no excoriation or lichenification.  Lesions: No evidence of an external lesion, nodularity, or induration in the perianal region.  Eversion of buttocks: There was not evidence of an anal fissure. Details: N/A.  Skin tags or external hemorrhoids: Yes: moderate sized mixed hemorrhoids circumferentially, largest in right anterior where there is also some internal component visible.    Digital rectal examination: Was performed.   Sphincter tone: Good.  Palpable lesions: No.  Other: None.  Bimanual examination: was not performed    Anoscopy: Was performed.   Hemorrhoids: Yes. Moderate to large sized internal hemorrhoids in all three columns.  Lesions: No    Procedures:  After discussing the risks and benefits, " the patient agreed to proceed with internal hemorrhoidal banding.    Prior to the start of the procedure and with procedural staff participation, I verbally confirmed the patient s identity using two indicators, relevant allergies, that the procedure was appropriate and matched the consent or emergent situation, and that the correct equipment/implants were available. Immediately prior to starting the procedure I conducted the Time Out with the procedural staff and re-confirmed the patient s name, procedure, and site/side. (The Joint Commission universal protocol was followed.)  Yes    Sedation (Moderate or Deep): None    A suction hemorrhoidal  was used to place a total of 1 band(s) in the right anterior position(s).    There was no significant bleeding. The patient tolerated the procedure well.    Procedure was performed under collaborating agreement with Dr. Ta Sprague MD, Chief of the Division of Colon and Rectal Surgery    Assessment/Plan: Wonlue T Kegbeh is a 42 year old female here with rectal bleeding. I suspect that she has some bleeding from some prolapsing internal hemorrhoids. We discussed banding today which she is agreeable to. Discussed risk of discomfort, pain, bleeding, infection. She voiced understanding and agreed to proceed. One band was placed in the right anterior and she tolerated this well. Stay off aspirin for 2 weeks. Return to clinic if bleeding recurs. Also advised starting a daily fiber supplement. Patient's questions were answered to her stated satisfaction and she is in agreement with this plan.     Medical history:  Past Medical History:   Diagnosis Date     Essential hypertension      High cholesterol        Surgical history:  Past Surgical History:   Procedure Laterality Date     NO PAST SURGERIES         Problem list:    Patient Active Problem List    Diagnosis Date Noted     Female infertility 07/30/2020     Priority: Medium     Mantoux: positive 05/22/2017      Priority: Medium     Borderline hypertension 04/19/2014     Priority: Medium       Medications:  Current Outpatient Medications   Medication Sig Dispense Refill     amLODIPine (NORVASC) 5 MG tablet Take 1 tablet (5 mg) by mouth daily 90 tablet 1     atorvastatin (LIPITOR) 40 MG tablet Take 1 tablet (40 mg) by mouth daily 90 tablet 1     Propylene Glycol (SYSTANE BALANCE OP) Apply 1 drop to eye daily       triamcinolone (KENALOG) 0.1 % external cream Apply topically 2 times daily Do not use longer than 14 days 30 g 1     Prenatal Vit-Fe Fumarate-FA (PRENATAL MULTIVITAMIN W/IRON) 27-0.8 MG tablet Take 1 tablet by mouth daily (Patient not taking: Reported on 3/13/2023) 90 tablet 3       Allergies:  No Known Allergies    Family history:  Family History   Problem Relation Age of Onset     No Known Problems Mother      No Known Problems Father      No Known Problems Sister      No Known Problems Brother      No Known Problems Brother      No Known Problems Brother      No Known Problems Brother        Social history:  Social History     Tobacco Use     Smoking status: Never     Passive exposure: Never     Smokeless tobacco: Never   Vaping Use     Vaping status: Never Used   Substance Use Topics     Alcohol use: Never    Marital status: single.  Occupation: CNA.    Nursing Notes:   Victor Manuel Sweet, EMT  6/12/2023  1:15 PM  Signed  Chief Complaint   Patient presents with     Consult       Vitals:    06/12/23 1311   BP: (!) 138/94   BP Location: Right arm   Patient Position: Sitting   Cuff Size: Adult Large   Pulse: 83   SpO2: 100%       There is no height or weight on file to calculate BMI.    Victor Manuel Sweet EMT-P         20 minutes spent on the date of encounter performing chart review, history and exam, documentation and further activities as noted above with an additional 5 minutes for anoscopy and banding.     -----------------------------------------------------  Kayla Avalos PA-C  Colon and Rectal Surgery   [facility]  [facility]

## 2023-06-12 NOTE — LETTER
"2023       RE: Wonlue T Kegbeh  [address]  [place] [address]     Dear Colleague,    Thank you for referring your patient, Wonlue T Kegbeh, to the [facility] COLON AND RECTAL SURGERY CLINIC [place] [facility]. Please see a copy of my visit note below.    Colon and Rectal Surgery Consult Clinic Note    Date: 2023     Referring provider:  YEIMI Castro CNP  [address] [place] [address]     RE: Wonlue T Kegbeh  : 1980  NICHOLE: 2023    Wonlue T Kegbeh is a very pleasant 42 year old female here with concerns for rectal bleeding.    Noah reports about 1 year of rectal bleeding that occurs in the days leading up to her menses. This bleeding occurs outside of bowel movements and she has to wear a pad. She is certain that this is not vaginal bleeding as the blood is further back on the pad. Occasional pain with bowel movements. She last had bleeding last month before her last period. She describes her bowel movements as \"normal\" once every other day. Her stools are usually formed, sometimes pellet-like.     No prior colonoscopy. No family history of colorectal cancer.    Physical Examination:  BP (!) 138/94 (BP Location: Right arm, Patient Position: Sitting, Cuff Size: Adult Large)   Pulse 83   LMP 2023 (Exact Date)   SpO2 100%   General: alert, oriented, in no acute distress, sitting comfortably  HEENT: moist mucous membranes    Perianal external examination: Exam was chaperoned by Victor Manuel Sweet, EMT-P   Perianal skin: Intact with no excoriation or lichenification.  Lesions: No evidence of an external lesion, nodularity, or induration in the perianal region.  Eversion of buttocks: There was not evidence of an anal fissure. Details: N/A.  Skin tags or external hemorrhoids: Yes: moderate sized mixed hemorrhoids circumferentially, largest in right anterior where there is also some internal component " visible.    Digital rectal examination: Was performed.   Sphincter tone: Good.  Palpable lesions: No.  Other: None.  Bimanual examination: was not performed    Anoscopy: Was performed.   Hemorrhoids: Yes. Moderate to large sized internal hemorrhoids in all three columns.  Lesions: No    Procedures:  After discussing the risks and benefits, the patient agreed to proceed with internal hemorrhoidal banding.    Prior to the start of the procedure and with procedural staff participation, I verbally confirmed the patient s identity using two indicators, relevant allergies, that the procedure was appropriate and matched the consent or emergent situation, and that the correct equipment/implants were available. Immediately prior to starting the procedure I conducted the Time Out with the procedural staff and re-confirmed the patient s name, procedure, and site/side. (The Joint Commission universal protocol was followed.)  Yes    Sedation (Moderate or Deep): None    A suction hemorrhoidal  was used to place a total of 1 band(s) in the right anterior position(s).    There was no significant bleeding. The patient tolerated the procedure well.    Procedure was performed under collaborating agreement with Dr. Ta Sprague MD, Chief of the Division of Colon and Rectal Surgery    Assessment/Plan: Wonlue T Kegbeh is a 42 year old female here with rectal bleeding. I suspect that she has some bleeding from some prolapsing internal hemorrhoids. We discussed banding today which she is agreeable to. Discussed risk of discomfort, pain, bleeding, infection. She voiced understanding and agreed to proceed. One band was placed in the right anterior and she tolerated this well. Stay off aspirin for 2 weeks. Return to clinic if bleeding recurs. Also advised starting a daily fiber supplement. Patient's questions were answered to her stated satisfaction and she is in agreement with this plan.     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     Priority: Medium    Borderline hypertension 04/19/2014     Priority: Medium       Medications:  Current Outpatient Medications   Medication Sig Dispense Refill    amLODIPine (NORVASC) 5 MG tablet Take 1 tablet (5 mg) by mouth daily 90 tablet 1    atorvastatin (LIPITOR) 40 MG tablet Take 1 tablet (40 mg) by mouth daily 90 tablet 1    Propylene Glycol (SYSTANE BALANCE OP) Apply 1 drop to eye daily      triamcinolone (KENALOG) 0.1 % external cream Apply topically 2 times daily Do not use longer than 14 days 30 g 1    Prenatal Vit-Fe Fumarate-FA (PRENATAL MULTIVITAMIN W/IRON) 27-0.8 MG tablet Take 1 tablet by mouth daily (Patient not taking: Reported on 3/13/2023) 90 tablet 3       Allergies:  No Known Allergies    Family history:  Family History   Problem Relation Age of Onset    No Known Problems Mother     No Known Problems Father     No Known Problems Sister     No Known Problems Brother     No Known Problems Brother     No Known Problems Brother     No Known Problems Brother        Social history:  Social History     Tobacco Use    Smoking status: Never     Passive exposure: Never    Smokeless tobacco: Never   Vaping Use    Vaping status: Never Used   Substance Use Topics    Alcohol use: Never    Marital status: single.  Occupation: CNA.    Nursing Notes:   Victor Manuel Sweet, EMT  6/12/2023  1:15 PM  Signed  Chief Complaint   Patient presents with    Consult       Vitals:    06/12/23 1311   BP: (!) 138/94   BP Location: Right arm   Patient Position: Sitting   Cuff Size: Adult Large   Pulse: 83   SpO2: 100%       There is no height or weight on file to calculate BMI.    Victor Manuel Sweet  EMT-P         20 minutes spent on the date of encounter performing chart review, history and exam, documentation and further activities as noted above with an additional 5 minutes for anoscopy and banding.     -----------------------------------------------------  Kayla Avalos PA-C  Colon and Rectal Surgery   [facility]

## 2023-06-12 NOTE — NURSING NOTE
Chief Complaint   Patient presents with     Consult       Vitals:    06/12/23 1311   BP: (!) 138/94   BP Location: Right arm   Patient Position: Sitting   Cuff Size: Adult Large   Pulse: 83   SpO2: 100%       There is no height or weight on file to calculate BMI.    Victor Manuel Sweet EMT-P

## 2023-06-14 ENCOUNTER — OFFICE VISIT (OUTPATIENT)
Dept: FAMILY MEDICINE | Facility: CLINIC | Age: 43
End: 2023-06-14
Payer: COMMERCIAL

## 2023-06-14 VITALS
RESPIRATION RATE: 14 BRPM | DIASTOLIC BLOOD PRESSURE: 92 MMHG | SYSTOLIC BLOOD PRESSURE: 153 MMHG | OXYGEN SATURATION: 100 % | HEART RATE: 66 BPM | BODY MASS INDEX: 30.73 KG/M2 | WEIGHT: 168 LBS | TEMPERATURE: 98.1 F

## 2023-06-14 DIAGNOSIS — L70.9 ADULT ACNE: Primary | ICD-10-CM

## 2023-06-14 PROCEDURE — 99213 OFFICE O/P EST LOW 20 MIN: CPT | Performed by: PHYSICIAN ASSISTANT

## 2023-06-14 RX ORDER — CLINDAMYCIN PHOSPHATE 10 UG/ML
LOTION TOPICAL 2 TIMES DAILY
Qty: 90 ML | Refills: 0 | Status: SHIPPED | OUTPATIENT
Start: 2023-06-14 | End: 2023-07-14

## 2023-06-14 NOTE — PROGRESS NOTES
Patient presents with:  Derm Problem: Rash on face since May       Clinical Decision Making:  Patient is treated for adult acne with topical benzoyl peroxide and clindamycin.  Close follow-up with primary care provider for reevaluation in 2 to 3 weeks to check healing progress. Follow up with primary care provider if you do not get resolution with the course of treatment.  Return to walk-in care if complication or new symptoms arise in the interim.        ICD-10-CM    1. Adult acne  L70.9 benzoyl peroxide 5 % external liquid     clindamycin (CLEOCIN T) 1 % external lotion     PRIMARY CARE FOLLOW-UP SCHEDULING          Patient Instructions   Take the medication as written  Follow up with primary MD for reevaluaiton and treatment.        HPI:  Wonlue T Kegbeh is a 42 year old female who presents today for rash on face since May.  Using ointment for rash that was given urgent care visit on 5/10/2023.  Please see note for specifics.  In summary patient was placed on triamcinolone but did not get relief.    History obtained from chart review and the patient.    Problem List:  2020-07: Female infertility  2017-05: Mantoux: positive  2014-04: Borderline hypertension  2012-11: Pain in joint, other specified sites      Past Medical History:   Diagnosis Date     Essential hypertension      High cholesterol        Social History     Tobacco Use     Smoking status: Never     Passive exposure: Never     Smokeless tobacco: Never   Substance Use Topics     Alcohol use: Never       Review of Systems  As above in HPI otherwise negative.    Vitals:    06/14/23 1004   BP: (!) 153/92   BP Location: Right arm   Patient Position: Sitting   Cuff Size: Adult Large   Pulse: 66   Resp: 14   Temp: 98.1  F (36.7  C)   TempSrc: Tympanic   SpO2: 100%   Weight: 76.2 kg (168 lb)       General: Patient is resting comfortably no acute distress is afebrile  HEENT: Head is normocephalic atraumatic   eyes are PERRL EOMI sclera anicteric   TMs are clear  bilaterally  Skin: With comedonal and pustular rash on the face forehead and chin consistent with acne    Physical Exam    At the end of the encounter, I discussed results, diagnosis, medications. Discussed red flags for immediate return to clinic/ER, as well as indications for follow up if no improvement. Patient understood and agreed to plan. Patient was stable for discharge.

## 2023-07-10 ENCOUNTER — OFFICE VISIT (OUTPATIENT)
Dept: FAMILY MEDICINE | Facility: CLINIC | Age: 43
End: 2023-07-10
Payer: COMMERCIAL

## 2023-07-10 VITALS
TEMPERATURE: 98.3 F | HEIGHT: 62 IN | WEIGHT: 163.4 LBS | HEART RATE: 50 BPM | RESPIRATION RATE: 20 BRPM | SYSTOLIC BLOOD PRESSURE: 137 MMHG | BODY MASS INDEX: 30.07 KG/M2 | DIASTOLIC BLOOD PRESSURE: 82 MMHG | OXYGEN SATURATION: 99 %

## 2023-07-10 DIAGNOSIS — M75.52 BURSITIS OF BOTH SHOULDERS: Primary | ICD-10-CM

## 2023-07-10 DIAGNOSIS — M75.51 BURSITIS OF BOTH SHOULDERS: Primary | ICD-10-CM

## 2023-07-10 PROCEDURE — 99213 OFFICE O/P EST LOW 20 MIN: CPT | Performed by: FAMILY MEDICINE

## 2023-07-10 ASSESSMENT — PAIN SCALES - GENERAL: PAINLEVEL: NO PAIN (0)

## 2023-07-10 NOTE — PROGRESS NOTES
"  Assessment & Plan   Problem List Items Addressed This Visit    None  Visit Diagnoses     Bursitis of both shoulders    -  Primary    Rotator cuff bursitis  Exam findings and pathophysiology reviewed  plan: home shoulder exercises reviewed,  take ibuprofen as needed  consider PT if not better as anticipated                        Олег Cazares MD  [facility] [place] Zamora is a 42 year old, presenting with bilateral shoulder pain with no injury for the past 2 weeks.  She notes that the pain came on acutely with no previous history of a sharp shooting quality and some stiffness and brought on by certain shoulder movements.  She works as a nursing assistant required lifting at times            7/10/2023    12:27 PM   Additional Questions   Roomed by LOBO Manzo and Lillie   Accompanied by TINO        Review of Systems         Objective    /82 (BP Location: Right arm, Patient Position: Sitting, Cuff Size: Adult Regular)   Pulse 50   Temp 98.3  F (36.8  C) (Oral)   Resp 20   Ht 1.575 m (5' 2\")   Wt 74.1 kg (163 lb 6.4 oz)   LMP 06/26/2023 (Approximate)   SpO2 99%   BMI 29.89 kg/m    Body mass index is 29.89 kg/m .       Physical Exam   Positive bilateral shoulder impingement test                     "

## 2023-07-13 ASSESSMENT — EXTERNAL EXAM - RIGHT EYE: OD_EXAM: NORMAL

## 2023-07-13 ASSESSMENT — SLIT LAMP EXAM - LIDS
COMMENTS: 1+ DERMATOCHALASIS, 1+ MEIBOMIAN GLAND DYSFUNCTION
COMMENTS: 1+ DERMATOCHALASIS, 1+ MEIBOMIAN GLAND DYSFUNCTION

## 2023-07-13 ASSESSMENT — CUP TO DISC RATIO
OD_RATIO: 0.0
OS_RATIO: 0.1

## 2023-07-13 ASSESSMENT — EXTERNAL EXAM - LEFT EYE: OS_EXAM: NORMAL

## 2023-09-18 ENCOUNTER — OFFICE VISIT (OUTPATIENT)
Dept: FAMILY MEDICINE | Facility: CLINIC | Age: 43
End: 2023-09-18
Payer: COMMERCIAL

## 2023-09-18 VITALS
TEMPERATURE: 98.7 F | DIASTOLIC BLOOD PRESSURE: 103 MMHG | HEART RATE: 60 BPM | SYSTOLIC BLOOD PRESSURE: 160 MMHG | OXYGEN SATURATION: 100 % | RESPIRATION RATE: 18 BRPM

## 2023-09-18 DIAGNOSIS — B36.0 TINEA VERSICOLOR: Primary | ICD-10-CM

## 2023-09-18 PROCEDURE — 99213 OFFICE O/P EST LOW 20 MIN: CPT | Performed by: FAMILY MEDICINE

## 2023-09-18 RX ORDER — FLUCONAZOLE 150 MG/1
TABLET ORAL
Qty: 4 TABLET | Refills: 0 | Status: SHIPPED | OUTPATIENT
Start: 2023-09-18

## 2023-09-18 NOTE — PROGRESS NOTES
(B36.0) Tinea versicolor  (primary encounter diagnosis)  Comment:   Plan: fluconazole (DIFLUCAN) 150 MG tablet    Treatment plan discussed.  She was urged to be patient for several weeks as the pigment returns.            CHIEF COMPLAINT    Check rash on back primarily.      HISTORY    Patient has some whitish areas on her back.    She is otherwise feeling well.      EXAM  BP (!) 160/103   Pulse 60   Temp 98.7  F (37.1  C) (Oral)   Resp 18   LMP 09/11/2023   SpO2 100%     She has a little patch of tinea versicolor right low back and a few scattered areas on her upper back.

## 2023-10-09 DIAGNOSIS — I10 BENIGN ESSENTIAL HYPERTENSION: ICD-10-CM

## 2023-10-09 RX ORDER — AMLODIPINE BESYLATE 5 MG/1
5 TABLET ORAL DAILY
Qty: 90 TABLET | Refills: 1 | Status: SHIPPED | OUTPATIENT
Start: 2023-10-09

## 2023-10-09 NOTE — TELEPHONE ENCOUNTER
[facility]    Renee ORTEGA CMA  [facility]

## 2023-10-09 NOTE — TELEPHONE ENCOUNTER
Please call and get her schedule for an appointment with me for BP (can be a physical if she is due).

## 2023-11-01 ENCOUNTER — TELEPHONE (OUTPATIENT)
Dept: FAMILY MEDICINE | Facility: CLINIC | Age: 43
End: 2023-11-01
Payer: COMMERCIAL

## 2024-01-22 ENCOUNTER — PATIENT OUTREACH (OUTPATIENT)
Dept: CARE COORDINATION | Facility: CLINIC | Age: 44
End: 2024-01-22
Payer: COMMERCIAL

## 2024-04-23 ENCOUNTER — OFFICE VISIT (OUTPATIENT)
Dept: FAMILY MEDICINE | Facility: CLINIC | Age: 44
End: 2024-04-23
Payer: COMMERCIAL

## 2024-04-23 VITALS
OXYGEN SATURATION: 100 % | TEMPERATURE: 98 F | RESPIRATION RATE: 16 BRPM | DIASTOLIC BLOOD PRESSURE: 100 MMHG | SYSTOLIC BLOOD PRESSURE: 166 MMHG | HEART RATE: 66 BPM

## 2024-04-23 DIAGNOSIS — J30.89 SEASONAL ALLERGIC RHINITIS DUE TO OTHER ALLERGIC TRIGGER: Primary | ICD-10-CM

## 2024-04-23 PROCEDURE — 99213 OFFICE O/P EST LOW 20 MIN: CPT | Performed by: NURSE PRACTITIONER

## 2024-04-23 RX ORDER — CETIRIZINE HYDROCHLORIDE 10 MG/1
10 TABLET ORAL DAILY
Qty: 30 TABLET | Refills: 3 | Status: SHIPPED | OUTPATIENT
Start: 2024-04-23

## 2024-04-23 NOTE — PROGRESS NOTES
Assessment & Plan     Seasonal allergic rhinitis due to other allergic trigger    - cetirizine (ZYRTEC) 10 MG tablet  Dispense: 30 tablet; Refill: 3     Typical allergy symptoms ongoing for 1 month-sneezing, coughing, dry scratchy throat, ear itching.  Has not had these before has not tried anything.  Recommend Zyrtec daily, if ineffective or partially effective, can add Flonase OTC.  Given handout regarding things to consider such as washing pillows.  Recheck if not better after a few days of Flonase    Exam is otherwise normal with clear lung sounds.          No follow-ups on file.    Kendy Camacho, [facility] [place] Zamora is a 43 year old female who presents to clinic today for the following health issues:  Chief Complaint   Patient presents with    Throat Problem     Dry and irritated throat for 1 month. Cough. Sneezing. Headache. Some burning on face at times.     HPI    Dry, irritated throat with clear, runny nose, cough, sneezing x 1.    No known h/o allergies.      No fevers.      Taking cough medicine and cough drops.  Not working.    Has not tried any allergy medication        Review of Systems    See HPI        Objective    BP (!) 166/100   Pulse 66   Temp 98  F (36.7  C) (Oral)   Resp 16   SpO2 100%   Physical Exam  Constitutional:       General: She is not in acute distress.     Appearance: She is well-developed.   HENT:      Right Ear: Tympanic membrane normal.      Left Ear: Tympanic membrane normal.      Nose: Rhinorrhea present.      Right Turbinates: Swollen and pale.      Left Turbinates: Swollen and pale.      Right Sinus: No maxillary sinus tenderness or frontal sinus tenderness.      Left Sinus: No maxillary sinus tenderness or frontal sinus tenderness.   Eyes:      General:         Right eye: No discharge.         Left eye: No discharge.      Conjunctiva/sclera: Conjunctivae normal.   Cardiovascular:      Rate and Rhythm: Normal rate and regular  rhythm.      Pulses: Normal pulses.      Heart sounds: Normal heart sounds.   Pulmonary:      Effort: Pulmonary effort is normal.      Breath sounds: Normal breath sounds.   Musculoskeletal:         General: Normal range of motion.   Lymphadenopathy:      Cervical: No cervical adenopathy.   Skin:     General: Skin is warm and dry.      Capillary Refill: Capillary refill takes less than 2 seconds.   Neurological:      Mental Status: She is alert and oriented to person, place, and time.   Psychiatric:         Mood and Affect: Mood normal.         Behavior: Behavior normal.         Thought Content: Thought content normal.         Judgment: Judgment normal.

## 2024-05-12 ENCOUNTER — HEALTH MAINTENANCE LETTER (OUTPATIENT)
Age: 44
End: 2024-05-12

## 2024-08-18 ENCOUNTER — PATIENT OUTREACH (OUTPATIENT)
Dept: CARE COORDINATION | Facility: CLINIC | Age: 44
End: 2024-08-18
Payer: COMMERCIAL

## 2024-10-07 ENCOUNTER — OFFICE VISIT (OUTPATIENT)
Dept: FAMILY MEDICINE | Facility: CLINIC | Age: 44
End: 2024-10-07

## 2024-10-07 VITALS
HEART RATE: 76 BPM | WEIGHT: 178 LBS | OXYGEN SATURATION: 99 % | BODY MASS INDEX: 32.76 KG/M2 | DIASTOLIC BLOOD PRESSURE: 100 MMHG | HEIGHT: 62 IN | RESPIRATION RATE: 18 BRPM | SYSTOLIC BLOOD PRESSURE: 140 MMHG | TEMPERATURE: 98 F

## 2024-10-07 DIAGNOSIS — Z23 NEED FOR IMMUNIZATION AGAINST TYPHOID: ICD-10-CM

## 2024-10-07 DIAGNOSIS — Z71.84 ENCOUNTER FOR COUNSELING FOR TRAVEL: Primary | ICD-10-CM

## 2024-10-07 PROCEDURE — 90471 IMMUNIZATION ADMIN: CPT | Mod: GA | Performed by: PHYSICIAN ASSISTANT

## 2024-10-07 PROCEDURE — 99401 PREV MED CNSL INDIV APPRX 15: CPT | Mod: 25 | Performed by: PHYSICIAN ASSISTANT

## 2024-10-07 PROCEDURE — 90691 TYPHOID VACCINE IM: CPT | Mod: GA | Performed by: PHYSICIAN ASSISTANT

## 2024-10-07 RX ORDER — AZITHROMYCIN 500 MG/1
TABLET, FILM COATED ORAL
Qty: 3 TABLET | Refills: 0 | Status: SHIPPED | OUTPATIENT
Start: 2024-10-07

## 2024-10-07 RX ORDER — ATOVAQUONE AND PROGUANIL HYDROCHLORIDE 250; 100 MG/1; MG/1
1 TABLET, FILM COATED ORAL DAILY
Qty: 40 TABLET | Refills: 0 | Status: SHIPPED | OUTPATIENT
Start: 2024-10-07

## 2024-10-07 NOTE — PROGRESS NOTES
Prior to immunization administration, verified patients identity using patient s name and date of birth. Please see Immunization Activity for additional information.     Screening Questionnaire for Adult Immunization    Are you sick today?   No   Do you have allergies to medications, food, a vaccine component or latex?   No   Have you ever had a serious reaction after receiving a vaccination?   No   Do you have a long-term health problem with heart, lung, kidney, or metabolic disease (e.g., diabetes), asthma, a blood disorder, no spleen, complement component deficiency, a cochlear implant, or a spinal fluid leak?  Are you on long-term aspirin therapy?   No   Do you have cancer, leukemia, HIV/AIDS, or any other immune system problem?   No   Do you have a parent, brother, or sister with an immune system problem?   No   In the past 3 months, have you taken medications that affect  your immune system, such as prednisone, other steroids, or anticancer drugs; drugs for the treatment of rheumatoid arthritis, Crohn s disease, or psoriasis; or have you had radiation treatments?   No   Have you had a seizure, or a brain or other nervous system problem?   No   During the past year, have you received a transfusion of blood or blood    products, or been given immune (gamma) globulin or antiviral drug?   No   For women: Are you pregnant or is there a chance you could become       pregnant during the next month?   No   Have you received any vaccinations in the past 4 weeks?   No

## 2024-10-07 NOTE — PROGRESS NOTES
SUBJECTIVE: Wonlue T Kegbeh , a 44 year old  female, presents for counseling and information regarding upcoming travel to [facility]. Special medical concerns include: none. She anticipates the following unusual exposures: none.    Itinerary:  [facility]    Departure Date: 12/10/24 Return date: 01/10/2025    Reason for travel (i.e. Business, pleasure): Pleasure    Visiting an urban or rural area?: Urban    Accommodations (i.e. hotel, hostel, friends, family, etc): Family    Women - First day of your last period: 09/27/2024    IMMUNIZATION HISTORY  Have you received any vaccinations in the past 4 weeks? If so, which? No  Have you ever fainted from having your blood drawn or from an injection?  No  Have you ever had any bad reaction or side effect from any vaccination?  If so, which? No  Do you live (or work closely) with anyone who has AIDS, an AIDS-like condition, any other immune disorder or who is on chemotherapy for cancer?  No  Have you received any injection of immune globulin or any blood products during the past 12 months?  No    GENERAL MEDICAL HISTORY  Do you have a medical condition that requires medicine or doctor follow-up visits?  No  Do you have a medical condition that is stable now, but that may recur while traveling?  No  Has your spleen been removed?  No  Have you had an illness or a fever in the past 48 hours?  No  Are you pregnant, or might you become pregnant on this trip?  Any chance of pregnancy?  No  Are you breastfeeding?  No  Do you have HIV, AIDS, an AIDS-like condition, any other immune disorder, leukemia or cancer?  No  Have you had your thymus gland removed or history of problems with your thymus, such as myasthenia gravis, DiGeorge syndrome, or thymoma?  No  Do you have a severely low platelet count (thrombocytopenia) or a blood clotting disorder?  No  Have you ever had a convulsion, seizure, epilepsy, neurologic condition or brain infection?  No  Do you have any stomach conditions?  No  Do  you have severe renal or kidney impairment?  No  Do you have a history of mental health concerns?  No  Do you get yeast infections often?  No  Do you have psoriasis?  No  Do you get motion sickness?  No  Have you ever had headaches, nausea, vomiting, or breathing problems from altitude exposure?  No    MEDICINES  Are you taking:   Steroids, prednisone, anti-cancer drugs, or medicines that suppress your immune system? No  Antibiotics or sulfonamides? No  Oral contraceptives (birth control pills)? No  Aspirin therapy (children and teens)? No    ALLERGIES  Are you allergic to:  Any medicines? No  Any foods or other? No  Neomycin, formalin, or fish products? No      Past Medical History:   Diagnosis Date    Essential hypertension     High cholesterol       Immunization History   Administered Date(s) Administered    COVID-19 MONOVALENT 12+ (Pfizer) 02/05/2022    COVID-19 Vaccine ([facility]) 11/18/2021    Flu, Unspecified 10/30/2008    HPV Quadrivalent 05/23/2007, 07/09/2009, 07/08/2010    Hepatitis A Immunity: Titer/MD Dx 05/14/2009    Hepatitis B Immunity: Titer 07/09/2009    Hepatitis B, Adult 12/31/2005    Influenza Vaccine >6 months,quad, PF 10/15/2019    MMR 11/17/2014, 01/20/2020    Mantoux Tuberculin Skin Test 07/07/2004, 07/09/2004    Poliovirus, inactivated (IPV) 05/14/2009    TDAP (Adacel,Boostrix) 03/23/2012, 12/16/2021    Td (Adult), Adsorbed 05/14/2003, 03/21/2005    Typhoid IM 05/14/2009, 04/04/2018, 01/20/2020, 03/17/2022    Typhoid, Unspecified Formulation 05/14/2009    Yellow Fever 05/14/2009       Current Outpatient Medications   Medication Sig Dispense Refill    cetirizine (ZYRTEC) 10 MG tablet Take 1 tablet (10 mg) by mouth daily 30 tablet 3    amLODIPine (NORVASC) 5 MG tablet Take 1 tablet (5 mg) by mouth daily (Patient not taking: Reported on 4/23/2024) 90 tablet 1    atorvastatin (LIPITOR) 40 MG tablet Take 1 tablet (40 mg) by mouth daily (Patient not taking: Reported on 4/23/2024) 90 tablet 1     fluconazole (DIFLUCAN) 150 MG tablet 2 tablets today and repeat 2 tablets in 1 week. (Patient not taking: Reported on 4/23/2024) 4 tablet 0    Prenatal Vit-Fe Fumarate-FA (PRENATAL MULTIVITAMIN W/IRON) 27-0.8 MG tablet Take 1 tablet by mouth daily (Patient not taking: Reported on 4/23/2024) 90 tablet 3    Propylene Glycol (SYSTANE BALANCE OP) Apply 1 drop to eye daily (Patient not taking: Reported on 4/23/2024)      triamcinolone (KENALOG) 0.1 % external cream Apply topically 2 times daily Do not use longer than 14 days (Patient not taking: Reported on 4/23/2024) 30 g 1     No Known Allergies     EXAM: deferred    Immunizations discussed include: Typhoid  Malaria prophylaxis recommended: Malarone  Symptomatic treatment for traveler's diarrhea: bismuth subsalicylate, loperamide/diphenoxylate, and azithromycin    ASSESSMENT/PLAN:    (Z71.84) Encounter for counseling for travel  (primary encounter diagnosis)    Comment: Typhoid vaccines today. Patient will return or follow-up with PCP as needed. Prophylaxis given for Traveler's diarrhea and Malaria. All questions were answered.     Plan: atovaquone-proguanil (MALARONE) 250-100 MG         tablet, azithromycin (ZITHROMAX) 500 MG tablet            (Z23) Need for immunization against typhoid  Comment:   Plan: TYPHOID VACCINE, IM              I have reviewed general recommendations for safe travel   including: food/water precautions, insect avoidance, safe sex   practices given high prevalence of HIV and other STDs,   roadway safety. Educational materials and links to the [facility]   Traveler's health website have been provided.    Total time 16 minutes, greater than 50 percent in counseling   and coordination of care.

## 2024-10-07 NOTE — PATIENT INSTRUCTIONS
"See travel packet provided  Recommend ultrathon (mosquito repellant), pepto bismol and imodium  The food and drink choices you make while traveling can impact your likelihood of getting sick.   If you aren't sure if a food or drink is safe, the saying \" BOIL IT, COOK IT, PEEL IT, OR FORGET IT\" can help you decide whether it's okay to consume.   Also bring hand  and sun screen with you.  Safe Travels     If you first start to get mild to moderate diarrhea, take imodium.      If diarrhea is severe or you have a fever with the diarrhea, take the antibiotic (azithromycin).      Today October 7, 2024 you received the    Typhoid - injectable. This vaccine is valid for two years. .    These appointments can be made as a NURSE ONLY visit.    **It is very important for the vaccinations to be given on the scheduled day(s), this helps ensure you receive the full effectiveness of the vaccine.**    Please call [facility] with any questions [phone]    Thank you for visiting [facility]    "

## 2024-10-17 ENCOUNTER — TELEPHONE (OUTPATIENT)
Dept: FAMILY MEDICINE | Facility: CLINIC | Age: 44
End: 2024-10-17

## 2024-10-17 NOTE — TELEPHONE ENCOUNTER
Patient calling and states she only got antibiotic.  Advised Malarone was sent and received at 8:26 am 10/7/24.  Advised to clarify with [facility] why they did not give her RX for Malarone.  Discussed may not be covered and then would be cash pay.  Discussed if out of stock locating at another pharmacy and having that pharmacy transfer from [facility] and fill for her.  Patient agrees with plan.  Isabel Fu RN

## 2025-01-27 ENCOUNTER — APPOINTMENT (OUTPATIENT)
Dept: CT IMAGING | Facility: HOSPITAL | Age: 45
End: 2025-01-27
Attending: EMERGENCY MEDICINE

## 2025-01-27 ENCOUNTER — HOSPITAL ENCOUNTER (EMERGENCY)
Facility: HOSPITAL | Age: 45
Discharge: HOME | End: 2025-01-27
Attending: EMERGENCY MEDICINE | Admitting: EMERGENCY MEDICINE

## 2025-01-27 VITALS
BODY MASS INDEX: 31.58 KG/M2 | HEART RATE: 94 BPM | OXYGEN SATURATION: 97 % | DIASTOLIC BLOOD PRESSURE: 98 MMHG | WEIGHT: 171.6 LBS | TEMPERATURE: 98.5 F | RESPIRATION RATE: 22 BRPM | HEIGHT: 62 IN | SYSTOLIC BLOOD PRESSURE: 155 MMHG

## 2025-01-27 DIAGNOSIS — R10.13 EPIGASTRIC PAIN: ICD-10-CM

## 2025-01-27 DIAGNOSIS — E87.6 HYPOKALEMIA: ICD-10-CM

## 2025-01-27 DIAGNOSIS — R19.7 VOMITING AND DIARRHEA: ICD-10-CM

## 2025-01-27 DIAGNOSIS — R11.10 VOMITING AND DIARRHEA: ICD-10-CM

## 2025-01-27 LAB
ALBUMIN SERPL BCG-MCNC: 4.5 G/DL (ref 3.5–5.2)
ALP SERPL-CCNC: 80 U/L (ref 40–150)
ALT SERPL W P-5'-P-CCNC: 11 U/L (ref 0–50)
ANION GAP SERPL CALCULATED.3IONS-SCNC: 13 MMOL/L (ref 7–15)
AST SERPL W P-5'-P-CCNC: 19 U/L (ref 0–45)
BASOPHILS # BLD AUTO: 0 10E3/UL (ref 0–0.2)
BASOPHILS NFR BLD AUTO: 0 %
BILIRUB DIRECT SERPL-MCNC: <0.2 MG/DL (ref 0–0.3)
BILIRUB SERPL-MCNC: 0.6 MG/DL
BUN SERPL-MCNC: 12.7 MG/DL (ref 6–20)
CALCIUM SERPL-MCNC: 10.2 MG/DL (ref 8.8–10.4)
CHLORIDE SERPL-SCNC: 100 MMOL/L (ref 98–107)
CREAT SERPL-MCNC: 0.91 MG/DL (ref 0.51–0.95)
EGFRCR SERPLBLD CKD-EPI 2021: 80 ML/MIN/1.73M2
EOSINOPHIL # BLD AUTO: 0 10E3/UL (ref 0–0.7)
EOSINOPHIL NFR BLD AUTO: 0 %
ERYTHROCYTE [DISTWIDTH] IN BLOOD BY AUTOMATED COUNT: 13 % (ref 10–15)
FLUAV RNA SPEC QL NAA+PROBE: NEGATIVE
FLUBV RNA RESP QL NAA+PROBE: NEGATIVE
GLUCOSE SERPL-MCNC: 101 MG/DL (ref 70–99)
HCG SERPL QL: NEGATIVE
HCO3 SERPL-SCNC: 24 MMOL/L (ref 22–29)
HCT VFR BLD AUTO: 44.6 % (ref 35–47)
HGB BLD-MCNC: 14.9 G/DL (ref 11.7–15.7)
HOLD SPECIMEN: NORMAL
IMM GRANULOCYTES # BLD: 0 10E3/UL
IMM GRANULOCYTES NFR BLD: 0 %
LIPASE SERPL-CCNC: 16 U/L (ref 13–60)
LYMPHOCYTES # BLD AUTO: 0.6 10E3/UL (ref 0.8–5.3)
LYMPHOCYTES NFR BLD AUTO: 6 %
MCH RBC QN AUTO: 25.6 PG (ref 26.5–33)
MCHC RBC AUTO-ENTMCNC: 33.4 G/DL (ref 31.5–36.5)
MCV RBC AUTO: 77 FL (ref 78–100)
MONOCYTES # BLD AUTO: 0.3 10E3/UL (ref 0–1.3)
MONOCYTES NFR BLD AUTO: 3 %
NEUTROPHILS # BLD AUTO: 8.9 10E3/UL (ref 1.6–8.3)
NEUTROPHILS NFR BLD AUTO: 90 %
NRBC # BLD AUTO: 0 10E3/UL
NRBC BLD AUTO-RTO: 0 /100
PLASMODIUM AG BLD QL IA: NEGATIVE
PLASMODIUM AG BLD QL IA: NEGATIVE
PLATELET # BLD AUTO: 289 10E3/UL (ref 150–450)
POTASSIUM SERPL-SCNC: 3 MMOL/L (ref 3.4–5.3)
PROT SERPL-MCNC: 8.9 G/DL (ref 6.4–8.3)
RBC # BLD AUTO: 5.81 10E6/UL (ref 3.8–5.2)
RSV RNA SPEC NAA+PROBE: NEGATIVE
SARS-COV-2 RNA RESP QL NAA+PROBE: NEGATIVE
SODIUM SERPL-SCNC: 137 MMOL/L (ref 135–145)
TROPONIN T SERPL HS-MCNC: <6 NG/L
WBC # BLD AUTO: 10 10E3/UL (ref 4–11)

## 2025-01-27 PROCEDURE — 82247 BILIRUBIN TOTAL: CPT | Performed by: EMERGENCY MEDICINE

## 2025-01-27 PROCEDURE — 74177 CT ABD & PELVIS W/CONTRAST: CPT

## 2025-01-27 PROCEDURE — 84460 ALANINE AMINO (ALT) (SGPT): CPT | Performed by: EMERGENCY MEDICINE

## 2025-01-27 PROCEDURE — 83690 ASSAY OF LIPASE: CPT | Performed by: EMERGENCY MEDICINE

## 2025-01-27 PROCEDURE — 36415 COLL VENOUS BLD VENIPUNCTURE: CPT | Performed by: EMERGENCY MEDICINE

## 2025-01-27 PROCEDURE — 250N000011 HC RX IP 250 OP 636: Performed by: EMERGENCY MEDICINE

## 2025-01-27 PROCEDURE — 87899 AGENT NOS ASSAY W/OPTIC: CPT | Performed by: EMERGENCY MEDICINE

## 2025-01-27 PROCEDURE — 93005 ELECTROCARDIOGRAM TRACING: CPT | Performed by: EMERGENCY MEDICINE

## 2025-01-27 PROCEDURE — 87637 SARSCOV2&INF A&B&RSV AMP PRB: CPT | Performed by: EMERGENCY MEDICINE

## 2025-01-27 PROCEDURE — 250N000013 HC RX MED GY IP 250 OP 250 PS 637: Performed by: EMERGENCY MEDICINE

## 2025-01-27 PROCEDURE — 82565 ASSAY OF CREATININE: CPT | Performed by: EMERGENCY MEDICINE

## 2025-01-27 PROCEDURE — 82248 BILIRUBIN DIRECT: CPT | Performed by: EMERGENCY MEDICINE

## 2025-01-27 PROCEDURE — 85041 AUTOMATED RBC COUNT: CPT | Performed by: EMERGENCY MEDICINE

## 2025-01-27 PROCEDURE — 258N000003 HC RX IP 258 OP 636: Performed by: EMERGENCY MEDICINE

## 2025-01-27 PROCEDURE — 87015 SPECIMEN INFECT AGNT CONCNTJ: CPT | Performed by: EMERGENCY MEDICINE

## 2025-01-27 PROCEDURE — 96361 HYDRATE IV INFUSION ADD-ON: CPT

## 2025-01-27 PROCEDURE — 96375 TX/PRO/DX INJ NEW DRUG ADDON: CPT

## 2025-01-27 PROCEDURE — 82947 ASSAY GLUCOSE BLOOD QUANT: CPT | Performed by: EMERGENCY MEDICINE

## 2025-01-27 PROCEDURE — 93005 ELECTROCARDIOGRAM TRACING: CPT | Performed by: STUDENT IN AN ORGANIZED HEALTH CARE EDUCATION/TRAINING PROGRAM

## 2025-01-27 PROCEDURE — 99285 EMERGENCY DEPT VISIT HI MDM: CPT | Mod: 25

## 2025-01-27 PROCEDURE — 84703 CHORIONIC GONADOTROPIN ASSAY: CPT | Performed by: EMERGENCY MEDICINE

## 2025-01-27 PROCEDURE — 96374 THER/PROPH/DIAG INJ IV PUSH: CPT | Mod: 59

## 2025-01-27 PROCEDURE — 84484 ASSAY OF TROPONIN QUANT: CPT | Performed by: EMERGENCY MEDICINE

## 2025-01-27 PROCEDURE — 85004 AUTOMATED DIFF WBC COUNT: CPT | Performed by: EMERGENCY MEDICINE

## 2025-01-27 PROCEDURE — 71275 CT ANGIOGRAPHY CHEST: CPT

## 2025-01-27 PROCEDURE — 82310 ASSAY OF CALCIUM: CPT | Performed by: EMERGENCY MEDICINE

## 2025-01-27 RX ORDER — DICYCLOMINE HCL 20 MG
20 TABLET ORAL 4 TIMES DAILY PRN
Qty: 30 TABLET | Refills: 0 | Status: SHIPPED | OUTPATIENT
Start: 2025-01-27 | End: 2025-02-06

## 2025-01-27 RX ORDER — HYDROMORPHONE HYDROCHLORIDE 1 MG/ML
0.5 INJECTION, SOLUTION INTRAMUSCULAR; INTRAVENOUS; SUBCUTANEOUS ONCE
Status: COMPLETED | OUTPATIENT
Start: 2025-01-27 | End: 2025-01-27

## 2025-01-27 RX ORDER — ONDANSETRON 2 MG/ML
4 INJECTION INTRAMUSCULAR; INTRAVENOUS ONCE
Status: COMPLETED | OUTPATIENT
Start: 2025-01-27 | End: 2025-01-27

## 2025-01-27 RX ORDER — ESOMEPRAZOLE MAGNESIUM 40 MG/1
40 CAPSULE, DELAYED RELEASE ORAL
Qty: 30 CAPSULE | Refills: 0 | Status: SHIPPED | OUTPATIENT
Start: 2025-01-27

## 2025-01-27 RX ORDER — POTASSIUM CHLORIDE 1500 MG/1
40 TABLET, EXTENDED RELEASE ORAL ONCE
Status: COMPLETED | OUTPATIENT
Start: 2025-01-27 | End: 2025-01-27

## 2025-01-27 RX ORDER — ONDANSETRON 4 MG/1
4 TABLET, ORALLY DISINTEGRATING ORAL EVERY 8 HOURS PRN
Qty: 30 TABLET | Refills: 0 | Status: SHIPPED | OUTPATIENT
Start: 2025-01-27

## 2025-01-27 RX ORDER — IOPAMIDOL 755 MG/ML
90 INJECTION, SOLUTION INTRAVASCULAR ONCE
Status: COMPLETED | OUTPATIENT
Start: 2025-01-27 | End: 2025-01-27

## 2025-01-27 RX ADMIN — ONDANSETRON 4 MG: 2 INJECTION, SOLUTION INTRAMUSCULAR; INTRAVENOUS at 13:31

## 2025-01-27 RX ADMIN — HYDROMORPHONE HYDROCHLORIDE 0.5 MG: 1 INJECTION, SOLUTION INTRAMUSCULAR; INTRAVENOUS; SUBCUTANEOUS at 13:25

## 2025-01-27 RX ADMIN — SODIUM CHLORIDE 1000 ML: 9 INJECTION, SOLUTION INTRAVENOUS at 13:36

## 2025-01-27 RX ADMIN — POTASSIUM CHLORIDE 40 MEQ: 1500 TABLET, EXTENDED RELEASE ORAL at 15:35

## 2025-01-27 RX ADMIN — FAMOTIDINE 20 MG: 10 INJECTION, SOLUTION INTRAVENOUS at 13:34

## 2025-01-27 RX ADMIN — IOPAMIDOL 90 ML: 755 INJECTION, SOLUTION INTRAVENOUS at 15:16

## 2025-01-27 ASSESSMENT — COLUMBIA-SUICIDE SEVERITY RATING SCALE - C-SSRS
2. HAVE YOU ACTUALLY HAD ANY THOUGHTS OF KILLING YOURSELF IN THE PAST MONTH?: NO
6. HAVE YOU EVER DONE ANYTHING, STARTED TO DO ANYTHING, OR PREPARED TO DO ANYTHING TO END YOUR LIFE?: NO
1. IN THE PAST MONTH, HAVE YOU WISHED YOU WERE DEAD OR WISHED YOU COULD GO TO SLEEP AND NOT WAKE UP?: NO

## 2025-01-27 ASSESSMENT — ACTIVITIES OF DAILY LIVING (ADL)
ADLS_ACUITY_SCORE: 47
ADLS_ACUITY_SCORE: 41
ADLS_ACUITY_SCORE: 47

## 2025-01-27 NOTE — ED NOTES
Patient continues to have intermittent desaturations on room air, this time noted to have sat of 91%. She does recover after 1-2 minutes.

## 2025-01-27 NOTE — DISCHARGE INSTRUCTIONS
Take zofran for nausea, bentyl for abdominal pain/cramping.  Take nexium daily.  You may try antidiarrheal medications.  Follow up with primary clinic. Return for new/worsening concerns.   Your scan shows inflammation in the lungs likely bronchitis  Also findings we see with a diarrheal illness  Incidental findings of a fibroid on your uterus and lung, follow close with your doctor

## 2025-01-27 NOTE — ED TRIAGE NOTES
Generalized abdominal pain with sscp since 0200. Emesis x 1 PT is tearful   Triage Assessment (Adult)       Row Name 01/27/25 1119          Triage Assessment    Airway WDL WDL        Respiratory WDL    Respiratory WDL WDL        Skin Circulation/Temperature WDL    Skin Circulation/Temperature WDL WDL        Cardiac WDL    Cardiac WDL chest pain        Chest Pain Assessment    Chest Pain Location substernal     Chest Pain Radiation other (see comments)  none     Duration since 0200, sharp     Precipitating Factors activity        Peripheral/Neurovascular WDL    Peripheral Neurovascular WDL X        Cognitive/Neuro/Behavioral WDL    Cognitive/Neuro/Behavioral WDL WDL

## 2025-01-27 NOTE — ED PROVIDER NOTES
EMERGENCY DEPARTMENT SIGN OUT NOTE        ED COURSE AND MEDICAL DECISION MAKING  2:37 PM Patient was signed out to me by Dr Dwayne Ortiz.  5:20 PM I rechecked on the patient. She is feeling better and tolerating oral intake. We discussed the plan for discharge and the patient is agreeable. Reviewed supportive cares, symptomatic treatment, outpatient follow up, and reasons to return to the Emergency Department. Patient to be discharged by ED RN.     In brief, Wonlue T Kegbeh is a 44 year old female who initially presented for evaluation of shortness of breath and diarrhea.      At time of sign out, disposition was pending imaging and repeat assessment.    FINAL IMPRESSION    1. Epigastric pain    2. Hypokalemia    3. Vomiting and diarrhea        ED MEDS  Medications   famotidine (PEPCID) injection 20 mg (20 mg Intravenous $Given 1/27/25 1334)   HYDROmorphone (PF) (DILAUDID) injection 0.5 mg (0.5 mg Intravenous $Given 1/27/25 1325)   ondansetron (ZOFRAN) injection 4 mg (4 mg Intravenous $Given 1/27/25 1331)   sodium chloride 0.9% BOLUS 1,000 mL (0 mLs Intravenous Stopped 1/27/25 1435)   potassium chloride santa ER (KLOR-CON M20) CR tablet 40 mEq (40 mEq Oral $Given 1/27/25 1535)   iopamidol (ISOVUE-370) solution 90 mL (90 mLs Intravenous $Given 1/27/25 1516)       LAB  Labs Ordered and Resulted from Time of ED Arrival to Time of ED Departure   BASIC METABOLIC PANEL - Abnormal       Result Value    Sodium 137      Potassium 3.0 (*)     Chloride 100      Carbon Dioxide (CO2) 24      Anion Gap 13      Urea Nitrogen 12.7      Creatinine 0.91      GFR Estimate 80      Calcium 10.2      Glucose 101 (*)    HEPATIC FUNCTION PANEL - Abnormal    Protein Total 8.9 (*)     Albumin 4.5      Bilirubin Total 0.6      Alkaline Phosphatase 80      AST 19      ALT 11      Bilirubin Direct <0.20     CBC WITH PLATELETS AND DIFFERENTIAL - Abnormal    WBC Count 10.0      RBC Count 5.81 (*)     Hemoglobin 14.9      Hematocrit 44.6      MCV 77  (*)     MCH 25.6 (*)     MCHC 33.4      RDW 13.0      Platelet Count 289      % Neutrophils 90      % Lymphocytes 6      % Monocytes 3      % Eosinophils 0      % Basophils 0      % Immature Granulocytes 0      NRBCs per 100 WBC 0      Absolute Neutrophils 8.9 (*)     Absolute Lymphocytes 0.6 (*)     Absolute Monocytes 0.3      Absolute Eosinophils 0.0      Absolute Basophils 0.0      Absolute Immature Granulocytes 0.0      Absolute NRBCs 0.0     TROPONIN T, HIGH SENSITIVITY - Normal    Troponin T, High Sensitivity <6     LIPASE - Normal    Lipase 16     HCG QUALITATIVE PREGNANCY - Normal    hCG Serum Qualitative Negative     INFLUENZA A/B, RSV AND SARS-COV2 PCR   MALARIA ANTIGEN SCREEN   BLOOD PARASITOLOGY EXAM       EKG  Performed at: 11:28:38     Impression: Sinus rhythm. Possible left atrial enlargement.      Rate: 96 bpm  Rhythm: Sinus  Axis: 5  ID Interval: 168 ms  QRS Interval: 80 ms  QTc Interval: 457 ms  ST Changes: N/A  Comparison: When compared with EKG from 10-Apr-2023 11:01, ventricular rate has increased by 42 bpm. Nonspecific T wave abnormality no longer evident in inferior and lateral leads. QT has lengthened.     I have independently reviewed and interpreted the EKG(s) documented above.      RADIOLOGY    CT Abdomen Pelvis w Contrast   Final Result   IMPRESSION:    1.  No pulmonary artery embolism.   2.  Mild bronchiolitis with mild bronchial wall thickening and pulmonary air trapping. No pneumonic infiltrate or pleural effusion.   3.  Mild to moderate nonspecific enteritis with associated mild small bowel distention likely reflecting a mild ileus.   4.  Normal caliber fluid-filled colon consistent with diarrheal state. No associated colonic inflammatory changes.   5.  Enlarged heterogeneous uterus with a mildly thickened endometrium and suspected calcified fundal fibroid. Recommend nonemergent follow-up with pelvic ultrasound.   6.  Left upper lobe 5 mm pulmonary nodule. Recommend follow-up per the  guidelines below.         REFERENCE:   Guidelines for Management of Incidental Pulmonary Nodules Detected on CT Images: From the Fleischner Society 2017.    Guidelines apply to incidental nodules in patients who are 35 years or older.   Guidelines do not apply to lung cancer screening, patients with immunosuppression, or patients with known primary cancer.      SINGLE NODULE   Nodule size less than or equal to 6 mm   Low-risk patients: No follow-up needed.   High-risk patients: Optional follow-up at 12 months.            CT Chest Pulmonary Embolism w Contrast   Final Result   IMPRESSION:    1.  No pulmonary artery embolism.   2.  Mild bronchiolitis with mild bronchial wall thickening and pulmonary air trapping. No pneumonic infiltrate or pleural effusion.   3.  Mild to moderate nonspecific enteritis with associated mild small bowel distention likely reflecting a mild ileus.   4.  Normal caliber fluid-filled colon consistent with diarrheal state. No associated colonic inflammatory changes.   5.  Enlarged heterogeneous uterus with a mildly thickened endometrium and suspected calcified fundal fibroid. Recommend nonemergent follow-up with pelvic ultrasound.   6.  Left upper lobe 5 mm pulmonary nodule. Recommend follow-up per the guidelines below.         REFERENCE:   Guidelines for Management of Incidental Pulmonary Nodules Detected on CT Images: From the Fleischner Society 2017.    Guidelines apply to incidental nodules in patients who are 35 years or older.   Guidelines do not apply to lung cancer screening, patients with immunosuppression, or patients with known primary cancer.      SINGLE NODULE   Nodule size less than or equal to 6 mm   Low-risk patients: No follow-up needed.   High-risk patients: Optional follow-up at 12 months.                DISCHARGE MEDS  New Prescriptions    DICYCLOMINE (BENTYL) 20 MG TABLET    Take 1 tablet (20 mg) by mouth 4 times daily as needed (abdominal pain/cramping).    ESOMEPRAZOLE  (NEXIUM) 40 MG DR CAPSULE    Take 1 capsule (40 mg) by mouth every morning (before breakfast). Take 30-60 minutes before eating.    ONDANSETRON (ZOFRAN ODT) 4 MG ODT TAB    Take 1 tablet (4 mg) by mouth every 8 hours as needed for vomiting or nausea.         Tanya Cavazos DO  Emergency Medicine  [facility] EMERGENCY DEPARTMENT  [address] [place] [id number] [phone]     Tanya Cavazos DO  01/29/25 0039

## 2025-01-27 NOTE — ED NOTES
Patient noted to have desaturation to 75% on room air with a reliable pleth noted on monitor. Instructed patient to take deep breaths and applied O2 via NC at 2LPM. Dr. Ortiz was informed

## 2025-01-27 NOTE — ED PROVIDER NOTES
Emergency Department Encounter     Evaluation Date & Time:   No admission date for patient encounter.    CHIEF COMPLAINT:  Shortness of Breath and Diarrhea      Triage Note:  Generalized abdominal pain with sscp since 0200. Emesis x 1 PT is tearful        ED COURSE & MEDICAL DECISION MAKING:     Pt here with family for epigastric/chest pain with n/v/d since overnight tonight.  Pt anxious, uncomfortable with associated TTP abdomen.  Pt did travel to [facility] and [place]. There is no current outbreak of Ebola, but nursing asked me about any concerns.  Pt has no bloody emesis or stool and no other sick contacts, including people with her.  Will get labs, CT PE and CT abd. More likely hepatobiliary/pancreatitis or gastritis. Will treat symptomatically and reassess.      ED Course as of 01/27/25 1446   Mon Jan 27, 2025   1255 I spoke with [facility] regarding pt's presentation and [facility] travel. No active hemorrhagic viral illnesses active there such as Ebola, so very low suspicion or risk.  No need for testing. Recommends malarial testing and regular workup.  I can call back with any other concerns.     1422 Pt briefly desatted, but after dilaudid. Family reports she told her to take deep breaths and resolved.     1423 hsTrop negative.  Lipase and LFTswnl.  CBC, chemistry overall reassuring. K 3, will replace.   1424 Pt updated on lab results.    1445 Pt signed out to Dr. Cavazos pending imaging.  Plan on discharge if reassuring. Rx for bentyl, zofran, nexium given.         Medical Decision Making    History:  Supplemental history from: Documented in chart  External Record(s) reviewed: Documented in chart    Work Up:  Chart documentation includes differential considered and any EKGs or imaging independently interpreted by provider, where specified.  In additional to work up documented, I considered the following work up: Documented in chart, if applicable.    External consultation:  Discussion of management  with another provider: Documented in chart, if applicable    Complicating factors:  Care impacted by chronic illness: Documented in Chart  Care affected by social determinants of health: N/A    Disposition considerations: Admission considered. Patient was signed out to the oncoming physician, disposition pending.    Not Applicable     At the conclusion of the encounter I discussed the results of all the tests and the disposition. The questions were answered. The patient or family acknowledged understanding and was agreeable with the care plan.      MEDICATIONS GIVEN IN THE EMERGENCY DEPARTMENT:  Medications   potassium chloride santa ER (KLOR-CON M20) CR tablet 40 mEq (has no administration in time range)   famotidine (PEPCID) injection 20 mg (20 mg Intravenous $Given 1/27/25 1334)   HYDROmorphone (PF) (DILAUDID) injection 0.5 mg (0.5 mg Intravenous $Given 1/27/25 1325)   ondansetron (ZOFRAN) injection 4 mg (4 mg Intravenous $Given 1/27/25 1331)   sodium chloride 0.9% BOLUS 1,000 mL (1,000 mLs Intravenous $New Bag 1/27/25 1336)       NEW PRESCRIPTIONS STARTED AT TODAY'S ED VISIT:  New Prescriptions    DICYCLOMINE (BENTYL) 20 MG TABLET    Take 1 tablet (20 mg) by mouth 4 times daily as needed (abdominal pain/cramping).    ESOMEPRAZOLE (NEXIUM) 40 MG DR CAPSULE    Take 1 capsule (40 mg) by mouth every morning (before breakfast). Take 30-60 minutes before eating.    ONDANSETRON (ZOFRAN ODT) 4 MG ODT TAB    Take 1 tablet (4 mg) by mouth every 8 hours as needed for vomiting or nausea.       HPI   HPI     Wonlue T Kegbeh is a 44 year old female with no pertinent history who presents to this ED via walk-in with family for evaluation of epigastric abdominal pain with initially having diarrhea, nausea/vomiting since last night.  Pt reports abdominal pain radiates into chest.  [place] [facility] [place] back [place] since.  Denies any sick contacts.  Denies similar pain to this.  Denies cough,  "fevers, bloody/black stools or hematemesis.  Denies leg pain/swelling, hx of dvt/pe, cough.        REVIEW OF SYSTEMS:  See HPI      Medical History     Past Medical History:   Diagnosis Date    Essential hypertension     High cholesterol        Past Surgical History:   Procedure Laterality Date    NO PAST SURGERIES         Family History   Problem Relation Age of Onset    No Known Problems Mother     No Known Problems Father     No Known Problems Sister     No Known Problems Brother     No Known Problems Brother     No Known Problems Brother     No Known Problems Brother        Social History     Tobacco Use    Smoking status: Never     Passive exposure: Never    Smokeless tobacco: Never   Vaping Use    Vaping status: Never Used   Substance Use Topics    Alcohol use: Never    Drug use: Never       amLODIPine (NORVASC) 5 MG tablet  atorvastatin (LIPITOR) 40 MG tablet  atovaquone-proguanil (MALARONE) 250-100 MG tablet  azithromycin (ZITHROMAX) 500 MG tablet  cetirizine (ZYRTEC) 10 MG tablet  dicyclomine (BENTYL) 20 MG tablet  esomeprazole (NEXIUM) 40 MG DR capsule  fluconazole (DIFLUCAN) 150 MG tablet  ondansetron (ZOFRAN ODT) 4 MG ODT tab  Prenatal Vit-Fe Fumarate-FA (PRENATAL MULTIVITAMIN W/IRON) 27-0.8 MG tablet  Propylene Glycol (SYSTANE BALANCE OP)  triamcinolone (KENALOG) 0.1 % external cream        Physical Exam     Vitals:  BP (!) 171/103   Pulse 84   Temp 98.5  F (36.9  C) (Temporal)   Resp 22   Ht 1.575 m (5' 2\")   Wt 77.8 kg (171 lb 9.6 oz)   LMP 01/03/2025   SpO2 99%   BMI 31.39 kg/m      PHYSICAL EXAM:   Physical Exam  Vitals and nursing note reviewed.   Constitutional:       General: She is not in acute distress.     Appearance: Normal appearance.      Comments: Anxious, appears uncomfortable       HENT:      Head: Normocephalic and atraumatic.      Nose: Nose normal.      Mouth/Throat:      Mouth: Mucous membranes are moist.   Eyes:      Pupils: Pupils are equal, round, and reactive to light. "   Cardiovascular:      Rate and Rhythm: Normal rate and regular rhythm.      Pulses: Normal pulses.           Radial pulses are 2+ on the right side and 2+ on the left side.        Dorsalis pedis pulses are 2+ on the right side and 2+ on the left side.   Pulmonary:      Effort: Pulmonary effort is normal. No respiratory distress.      Breath sounds: Normal breath sounds.   Abdominal:      Palpations: Abdomen is soft.      Tenderness: There is abdominal tenderness in the right upper quadrant and epigastric area.   Musculoskeletal:      Cervical back: Full passive range of motion without pain and neck supple.      Comments: No calf tenderness or swelling b/l   Skin:     General: Skin is warm.      Findings: No rash.   Neurological:      General: No focal deficit present.      Mental Status: She is alert. Mental status is at baseline.      Comments: Fluent speech, no acute lateralizing deficits   Psychiatric:         Mood and Affect: Mood normal.         Behavior: Behavior normal.           Results     LAB:  All pertinent labs reviewed and interpreted  Labs Ordered and Resulted from Time of ED Arrival to Time of ED Departure   BASIC METABOLIC PANEL - Abnormal       Result Value    Sodium 137      Potassium 3.0 (*)     Chloride 100      Carbon Dioxide (CO2) 24      Anion Gap 13      Urea Nitrogen 12.7      Creatinine 0.91      GFR Estimate 80      Calcium 10.2      Glucose 101 (*)    HEPATIC FUNCTION PANEL - Abnormal    Protein Total 8.9 (*)     Albumin 4.5      Bilirubin Total 0.6      Alkaline Phosphatase 80      AST 19      ALT 11      Bilirubin Direct <0.20     CBC WITH PLATELETS AND DIFFERENTIAL - Abnormal    WBC Count 10.0      RBC Count 5.81 (*)     Hemoglobin 14.9      Hematocrit 44.6      MCV 77 (*)     MCH 25.6 (*)     MCHC 33.4      RDW 13.0      Platelet Count 289      % Neutrophils 90      % Lymphocytes 6      % Monocytes 3      % Eosinophils 0      % Basophils 0      % Immature Granulocytes 0      NRBCs  per 100 WBC 0      Absolute Neutrophils 8.9 (*)     Absolute Lymphocytes 0.6 (*)     Absolute Monocytes 0.3      Absolute Eosinophils 0.0      Absolute Basophils 0.0      Absolute Immature Granulocytes 0.0      Absolute NRBCs 0.0     TROPONIN T, HIGH SENSITIVITY - Normal    Troponin T, High Sensitivity <6     LIPASE - Normal    Lipase 16     HCG QUALITATIVE PREGNANCY - Normal    hCG Serum Qualitative Negative     MALARIA ANTIGEN SCREEN   BLOOD PARASITOLOGY EXAM       RADIOLOGY:  CT Chest Pulmonary Embolism w Contrast    (Results Pending)   CT Abdomen Pelvis w Contrast    (Results Pending)                ECG:  NSR, rate 96, normal intervals, no acute ischemia    I have independently reviewed and interpreted the EKG(s) documented above     PROCEDURES:  Procedures:  none      FINAL IMPRESSION:    ICD-10-CM    1. Epigastric pain  R10.13       2. Hypokalemia  E87.6       3. Vomiting and diarrhea  R11.10     R19.7           0 minutes of critical care time      Maciej Ortiz DO  Emergency Medicine  [facility] EMERGENCY DEPARTMENT  1/27/2025  12:23 PM          Maciej Ortiz MD  01/27/25 1447

## 2025-01-27 NOTE — Clinical Note
Wonlue Kegbeh was seen and treated in our emergency department on 1/27/2025.  She may return to work on 01/30/2025.       If you have any questions or concerns, please don't hesitate to call.      Macy, Tanya ZAVALA, DO

## 2025-01-28 ENCOUNTER — PATIENT OUTREACH (OUTPATIENT)
Dept: FAMILY MEDICINE | Facility: CLINIC | Age: 45
End: 2025-01-28

## 2025-01-28 LAB — MALARIA SMEAR BLD: NEGATIVE

## 2025-02-01 LAB
ATRIAL RATE - MUSE: 96 BPM
DIASTOLIC BLOOD PRESSURE - MUSE: NORMAL MMHG
INTERPRETATION ECG - MUSE: NORMAL
P AXIS - MUSE: 43 DEGREES
PR INTERVAL - MUSE: 168 MS
QRS DURATION - MUSE: 80 MS
QT - MUSE: 362 MS
QTC - MUSE: 457 MS
R AXIS - MUSE: 5 DEGREES
SYSTOLIC BLOOD PRESSURE - MUSE: NORMAL MMHG
T AXIS - MUSE: 44 DEGREES
VENTRICULAR RATE- MUSE: 96 BPM

## 2025-02-05 NOTE — TELEPHONE ENCOUNTER
Left 2nd message to return call to clinic.  If patient returns call please transfer to nursing line.  Closing encounter at this time.

## 2025-03-21 NOTE — PROGRESS NOTES
" Current Eye Medications: Systane daily both eyes     Subjective:  Here for complete eye exam today. Having sharp pains sometimes in the eyes and also watery both eyes. Light sensitivity off and on     Objective:  See Ophthalmology Exam.       Assessment:  Baseline eye exam.      ICD-10-CM    1. Examination of eyes and vision  Z01.00       2. Presbyopia  H52.4            Plan:  Glasses prescription given - optional  May use artificial tears up to four times a day (like Refresh Optive, Systane Balance, TheraTears, or generic artificial tears are ok. Avoid \"get the red out\" drops).   Could try a warm pack on your lids for 10 minutes once or twice daily.  Could try OTC allergy drop, either Zaditor twice daily or Pataday once daily if desire  Call in February 2025 for an appointment in June 2025 for Complete Exam    Dr. Coats [phone]       " Patient seen in person in Medication Management Service.    Patient states compliant all of the time with regimen.    No bleeding or thromboembolic side effects noted.    No significant med or dietary changes.    No significant recent illness or disease state changes.      Patient acknowledges they are still an active patient of referring provider which is dr. Solorzano  Yes     PT/INR done via POC meter per protocol.  INR was subtherapeutic at 1.4.  (goal 2 - 3)    Warfarin regimen will be increased to 10 mg daily.  Will retest in 2 weeks.    Patient understands dosing directions and information discussed.  Dosing schedule and follow up appointment given to patient. Progress note routed to referring physicians office.  Patient acknowledges working in Consult Agreement with Pharmacist as referred by his/her physician/provider.      For Pharmacy Admin Tracking Only    Intervention Detail: Adherence Monitorin and Dose Adjustment: 1, reason: Therapy Optimization  Total # of Interventions Recommended: 2  Total # of Interventions Accepted: 2  Time Spent (min): 20    Cindy StevensonD, BCPS 3/21/2025 6:47 AM

## 2025-04-28 ENCOUNTER — OFFICE VISIT (OUTPATIENT)
Dept: OBGYN | Facility: CLINIC | Age: 45
End: 2025-04-28
Payer: COMMERCIAL

## 2025-04-28 VITALS
WEIGHT: 171.69 LBS | SYSTOLIC BLOOD PRESSURE: 147 MMHG | DIASTOLIC BLOOD PRESSURE: 101 MMHG | HEART RATE: 90 BPM | BODY MASS INDEX: 31.4 KG/M2 | OXYGEN SATURATION: 98 %

## 2025-04-28 DIAGNOSIS — Z31.49 PROCREATION MANAGEMENT INVESTIGATION AND TESTING: Primary | ICD-10-CM

## 2025-04-28 DIAGNOSIS — Z12.4 SCREENING FOR CERVICAL CANCER: ICD-10-CM

## 2025-04-28 DIAGNOSIS — Z12.31 ENCOUNTER FOR SCREENING MAMMOGRAM FOR BREAST CANCER: ICD-10-CM

## 2025-04-28 LAB
EST. AVERAGE GLUCOSE BLD GHB EST-MCNC: 114 MG/DL
ESTRADIOL SERPL-MCNC: 55 PG/ML
FSH SERPL IRP2-ACNC: 13.8 MIU/ML
HBA1C MFR BLD: 5.6 % (ref 0–5.6)
MIS SERPL-MCNC: 0.15 NG/ML (ref 0.03–5.5)
PROLACTIN SERPL 3RD IS-MCNC: 14 NG/ML (ref 5–23)
TSH SERPL DL<=0.005 MIU/L-ACNC: 2.77 UIU/ML (ref 0.3–4.2)

## 2025-04-28 PROCEDURE — 3077F SYST BP >= 140 MM HG: CPT

## 2025-04-28 PROCEDURE — 84146 ASSAY OF PROLACTIN: CPT

## 2025-04-28 PROCEDURE — 3080F DIAST BP >= 90 MM HG: CPT

## 2025-04-28 PROCEDURE — 87624 HPV HI-RISK TYP POOLED RSLT: CPT

## 2025-04-28 PROCEDURE — 83001 ASSAY OF GONADOTROPIN (FSH): CPT

## 2025-04-28 PROCEDURE — 82166 ASSAY ANTI-MULLERIAN HORM: CPT

## 2025-04-28 PROCEDURE — 84443 ASSAY THYROID STIM HORMONE: CPT

## 2025-04-28 PROCEDURE — 82670 ASSAY OF TOTAL ESTRADIOL: CPT

## 2025-04-28 PROCEDURE — G0145 SCR C/V CYTO,THINLAYER,RESCR: HCPCS

## 2025-04-28 PROCEDURE — 83036 HEMOGLOBIN GLYCOSYLATED A1C: CPT

## 2025-04-28 PROCEDURE — 36415 COLL VENOUS BLD VENIPUNCTURE: CPT

## 2025-04-28 PROCEDURE — 99214 OFFICE O/P EST MOD 30 MIN: CPT

## 2025-04-28 PROCEDURE — 99459 PELVIC EXAMINATION: CPT

## 2025-04-28 NOTE — PATIENT INSTRUCTIONS
Here is the information for the fertility clinics we discussed:     [facility]   [email or web address]     [place] location:  [facility]  [address], [address]  [place] [address] [phone]     [place] location:  [facility]  [address]  [place] [address] [phone]  __________________________________________________    [facility]  [address] [place]  [phone]  __________________________________________________    [facility]  [email or web address]    [place] Location:  [address], [address]  [place] [address]     [place] Location:  [address], [address]  [place] [address] [phone]

## 2025-04-29 ENCOUNTER — PATIENT OUTREACH (OUTPATIENT)
Dept: CARE COORDINATION | Facility: CLINIC | Age: 45
End: 2025-04-29
Payer: COMMERCIAL

## 2025-04-29 LAB
HPV HR 12 DNA CVX QL NAA+PROBE: NEGATIVE
HPV16 DNA CVX QL NAA+PROBE: NEGATIVE
HPV18 DNA CVX QL NAA+PROBE: NEGATIVE
HUMAN PAPILLOMA VIRUS FINAL DIAGNOSIS: NORMAL

## 2025-05-01 LAB
BKR AP ASSOCIATED HPV REPORT: NORMAL
BKR LAB AP GYN ADEQUACY: NORMAL
BKR LAB AP GYN INTERPRETATION: NORMAL
BKR LAB AP PREVIOUS ABNORMAL: NORMAL
PATH REPORT.COMMENTS IMP SPEC: NORMAL
PATH REPORT.COMMENTS IMP SPEC: NORMAL
PATH REPORT.RELEVANT HX SPEC: NORMAL

## 2025-05-16 ENCOUNTER — ANCILLARY PROCEDURE (OUTPATIENT)
Dept: MAMMOGRAPHY | Facility: CLINIC | Age: 45
End: 2025-05-16
Payer: COMMERCIAL

## 2025-05-16 DIAGNOSIS — Z12.31 ENCOUNTER FOR SCREENING MAMMOGRAM FOR BREAST CANCER: ICD-10-CM

## 2025-05-16 PROCEDURE — 77063 BREAST TOMOSYNTHESIS BI: CPT

## 2025-05-18 ENCOUNTER — HEALTH MAINTENANCE LETTER (OUTPATIENT)
Age: 45
End: 2025-05-18

## 2025-05-20 ENCOUNTER — OFFICE VISIT (OUTPATIENT)
Dept: OBGYN | Facility: CLINIC | Age: 45
End: 2025-05-20
Payer: COMMERCIAL

## 2025-05-20 ENCOUNTER — ANCILLARY PROCEDURE (OUTPATIENT)
Dept: ULTRASOUND IMAGING | Facility: CLINIC | Age: 45
End: 2025-05-20
Payer: COMMERCIAL

## 2025-05-20 VITALS
HEART RATE: 89 BPM | OXYGEN SATURATION: 99 % | SYSTOLIC BLOOD PRESSURE: 162 MMHG | WEIGHT: 171.8 LBS | DIASTOLIC BLOOD PRESSURE: 109 MMHG | BODY MASS INDEX: 31.42 KG/M2

## 2025-05-20 DIAGNOSIS — N97.9 FEMALE INFERTILITY: Primary | ICD-10-CM

## 2025-05-20 DIAGNOSIS — Z31.49 PROCREATION MANAGEMENT INVESTIGATION AND TESTING: ICD-10-CM

## 2025-05-20 PROCEDURE — 76830 TRANSVAGINAL US NON-OB: CPT | Performed by: OBSTETRICS & GYNECOLOGY

## 2025-05-20 PROCEDURE — 99214 OFFICE O/P EST MOD 30 MIN: CPT

## 2025-05-20 PROCEDURE — 3080F DIAST BP >= 90 MM HG: CPT

## 2025-05-20 PROCEDURE — 3077F SYST BP >= 140 MM HG: CPT

## 2025-05-20 NOTE — PROGRESS NOTES
GYN Progress Note     CC: Follow up on ultrasound from today, transvaginal US of uterus and ovaries. Preliminary report shows relatively normal uterus and ovaries, there were two small fibroids noted. Final report is not available, will be read by an MD today.    HISTORY OF PRESENT ILLNESS:    Wonlue T Kegbeh is a 44 year old  who presents for evaluation of review of ultrasound today. The patient was already seen by me 25 to discuss infertility and pregnancy planning.        OB HISTORY:  OB History    Para Term  AB Living   0 0 0 0 0 0   SAB IAB Ectopic Multiple Live Births   0 0 0 0 0        PAST MEDICAL HISTORY:  Past Medical History:   Diagnosis Date    Essential hypertension     High cholesterol        PAST SURGICAL HISTORY:  Past Surgical History:   Procedure Laterality Date    NO PAST SURGERIES       Patient has discontinued all of her medications, per her report. Did discuss at last visit that amlodipne and lipitor are not safe for pregnancy, which she is hoping to achieve in the next year. Hypertension medications recommended for pregnancy are nifedipine and labetalol.     CURRENT MEDICATIONS:   Current Outpatient Medications   Medication Sig Dispense Refill    amLODIPine (NORVASC) 5 MG tablet Take 1 tablet (5 mg) by mouth daily (Patient not taking: Reported on 2025) 90 tablet 1    atorvastatin (LIPITOR) 40 MG tablet Take 1 tablet (40 mg) by mouth daily (Patient not taking: Reported on 2025) 90 tablet 1    atovaquone-proguanil (MALARONE) 250-100 MG tablet Take 1 tablet by mouth daily. Start 2 days before travel and continue 7 days after return. (Patient not taking: Reported on 2025) 40 tablet 0    azithromycin (ZITHROMAX) 500 MG tablet Take one tablet daily for up to 3 days as needed for traveler's diarrhea (Patient not taking: Reported on 2025) 3 tablet 0    cetirizine (ZYRTEC) 10 MG tablet Take 1 tablet (10 mg) by mouth daily (Patient not taking: Reported on  5/20/2025) 30 tablet 3    esomeprazole (NEXIUM) 40 MG DR capsule Take 1 capsule (40 mg) by mouth every morning (before breakfast). Take 30-60 minutes before eating. (Patient not taking: Reported on 5/20/2025) 30 capsule 0    fluconazole (DIFLUCAN) 150 MG tablet 2 tablets today and repeat 2 tablets in 1 week. (Patient not taking: Reported on 5/20/2025) 4 tablet 0    ondansetron (ZOFRAN ODT) 4 MG ODT tab Take 1 tablet (4 mg) by mouth every 8 hours as needed for vomiting or nausea. (Patient not taking: Reported on 5/20/2025) 30 tablet 0    Prenatal Vit-Fe Fumarate-FA (PRENATAL MULTIVITAMIN W/IRON) 27-0.8 MG tablet Take 1 tablet by mouth daily (Patient not taking: Reported on 5/20/2025) 90 tablet 3    Propylene Glycol (SYSTANE BALANCE OP) Apply 1 drop to eye daily (Patient not taking: Reported on 5/20/2025)      triamcinolone (KENALOG) 0.1 % external cream Apply topically 2 times daily Do not use longer than 14 days (Patient not taking: Reported on 5/20/2025) 30 g 1     ALLERGIES:  Patient has no known allergies.     SOCIAL HISTORY:  Social History     Tobacco Use    Smoking status: Never     Passive exposure: Never    Smokeless tobacco: Never   Vaping Use    Vaping status: Never Used   Substance Use Topics    Alcohol use: Never    Drug use: Never        FAMILY HISTORY:  Family History   Problem Relation Age of Onset    No Known Problems Mother     No Known Problems Father     No Known Problems Sister     No Known Problems Brother     No Known Problems Brother     No Known Problems Brother     No Known Problems Brother       PHYSICAL EXAMINATION:  VS:BP (!) 178/116   Pulse 88   Wt 77.9 kg (171 lb 12.8 oz)   LMP 04/20/2025 (Exact Date)   SpO2 99%   BMI 31.42 kg/m    Body mass index is 31.42 kg/m .       General: looks well, NAD.  Psych: Pleasant and cooperative.  Neuro: alert and oriented x3      Laboratory values:  4/28/25:  TSH: 2.77  hbgA1c: 5.6  AMH: 0.148  FSH: 13.8  Estradiol: 55  Prolactin: 14    Imaging  findings:        ASSESSMENT:  Wonlue T Kegbeh is a 44 year old  who presents for evaluation of fertility and follow up on US today.      PLAN:  (N97.9) Female infertility  (primary encounter diagnosis)  Plan:    -Patient with very high blood pressures today, initial BP was 178/116. Recheck was 162/109. Denies headache, visual changes, or any symptoms. Discussed these blood pressures are very dangerous and puts her at high risk for a stroke or heart attack. Was taking Norvasc and lipitor, states she discontinued all of her medications because the blood pressure medication gave her acne.   -Recommend going to 6th floor after this appointment to see if they have any available appointments today with a PCP. Blood pressure medications are not my specialty. Appointment scheduled for 25!  -Discussed egg reserve is quickly decliing. Would recommend meeting with a fertility specialist as soon as possible to determine if IVF would even be an option. Discussed possibility of needing a donor egg to achieve a pregnancy. She is unsure if she is willing to go that far to achieve a pregnancy, but will think about it going forward  -Discussed likelihood of getting pregnant spontaneously is less than 5% every month, and that number is with regular intercourse around ovulation. Currently her  is in [place] and they are not able to have regular intercourse.   -Could potentially go to a fertility specialist in another country (not sure about 's availability to come here) but would need both her and her partner at the appointment.   -Uterus and ovaries look healthy today. Two small fibroids present but should not affect a future pregnancy as far as anatomical issues.     Follow up with PCP for blood pressure management 25. Return to OB/GYN clinic YEIMI Devries CNP    A total of 30 minutes was spent by this provider reviewing past medical records, counseling patient, and documentation.

## 2025-05-26 ENCOUNTER — PATIENT OUTREACH (OUTPATIENT)
Dept: CARE COORDINATION | Facility: CLINIC | Age: 45
End: 2025-05-26
Payer: COMMERCIAL

## 2025-06-19 ENCOUNTER — ANCILLARY PROCEDURE (OUTPATIENT)
Dept: GENERAL RADIOLOGY | Facility: CLINIC | Age: 45
End: 2025-06-19
Attending: STUDENT IN AN ORGANIZED HEALTH CARE EDUCATION/TRAINING PROGRAM
Payer: COMMERCIAL

## 2025-06-19 ENCOUNTER — RESULTS FOLLOW-UP (OUTPATIENT)
Dept: RHEUMATOLOGY | Facility: CLINIC | Age: 45
End: 2025-06-19

## 2025-06-19 ENCOUNTER — OFFICE VISIT (OUTPATIENT)
Dept: RHEUMATOLOGY | Facility: CLINIC | Age: 45
End: 2025-06-19
Attending: STUDENT IN AN ORGANIZED HEALTH CARE EDUCATION/TRAINING PROGRAM
Payer: COMMERCIAL

## 2025-06-19 VITALS
WEIGHT: 175 LBS | DIASTOLIC BLOOD PRESSURE: 109 MMHG | SYSTOLIC BLOOD PRESSURE: 167 MMHG | BODY MASS INDEX: 32.01 KG/M2 | OXYGEN SATURATION: 100 % | HEART RATE: 67 BPM

## 2025-06-19 DIAGNOSIS — M05.79 RHEUMATOID ARTHRITIS, SEROPOSITIVE, MULTIPLE SITES (H): ICD-10-CM

## 2025-06-19 DIAGNOSIS — M05.79 RHEUMATOID ARTHRITIS, SEROPOSITIVE, MULTIPLE SITES (H): Primary | ICD-10-CM

## 2025-06-19 PROCEDURE — 73130 X-RAY EXAM OF HAND: CPT | Mod: LT | Performed by: RADIOLOGY

## 2025-06-19 RX ORDER — HYDROXYCHLOROQUINE SULFATE 200 MG/1
200 TABLET, FILM COATED ORAL 2 TIMES DAILY
Qty: 180 TABLET | Refills: 1 | Status: SHIPPED | OUTPATIENT
Start: 2025-06-19

## 2025-06-19 RX ORDER — PREDNISONE 5 MG/1
TABLET ORAL
Qty: 116 TABLET | Refills: 0 | Status: SHIPPED | OUTPATIENT
Start: 2025-06-19 | End: 2025-09-08

## 2025-06-19 ASSESSMENT — PAIN SCALES - GENERAL: PAINLEVEL_OUTOF10: SEVERE PAIN (10)

## 2025-06-19 NOTE — PROGRESS NOTES
Rheumatology Outpatient Consult Note    DATE OF SERVICE: 6/19/2025    REQUESTING PHYSICIAN:  Kayleigh Abbott      CHIEF COMPLAINT:  Chief Complaint   Patient presents with    Consult      Polyarthritis with positive rheumatoid factor    New Patient     Referred by: Kayleigh Abbott DO       Noah is a 44 year old patient who presents today to the  Rheumatology Clinic at the request of Kayleigh Abbott  for consultation of +RF .    Subjective     HISTORY OF PRESENT ILLNESS     has a past medical history of Essential hypertension and High cholesterol.    She has no past medical history of Amblyopia, Arthritis, Diabetes (H), or Strabismus.   has a past surgical history that includes No Past Surgeries.      Patient reports 7mo hx of joint pain that started suddenly. She noted first affect b/l hands with swelling and stiffness then progressed to b/l wrist, knees, ankles, feet, toes with swelling, stiffness and pain. She note the pain is constant but worst in AM for 2 hours. She did not have insurance when symptoms first started so used OTC NSAID/Tylenol -- helped only 10% of pain. She is currently planning for pregnancy.     Labs with +RF, +CCP, +MARY     Cyclic Citrullinated Peptide Antibody IgG  <7.0 U/mL >340.0 High       Rheumatoid Factor  <14 IU/mL >650 High       MARY titer 1 1:320       RHEUMATOLOGY REVIEW OF SYSTEMS:     No history seizures  No inflammatory eye disease, iritis or uveitis.   No dry eyes,  no dry mouth,  No mouth sores  No skin rashes, no psoriasis   No photosensitivity, hair loss,  No Raynaud's phenomenon  No history of clotting episodes: DVT, PE    I have reviewed medical records and performed history and exam.    ASSESSMENT:  1. Rheumatoid arthritis, seropositive, multiple sites (H)        PLAN:    (M05.79) Rheumatoid arthritis, seropositive, multiple sites (H)  (primary encounter diagnosis)  Comment: New diagnosis of +CCP, +RF seropositive RA - affecting severely multiple joints with synovitis  of b/l hands, wrist, ankles, feet. In setting of active pregnancy planning - due to this will choose HCQ for initial therapy. (methotrexate and leflunomide are contraindicated in this setting).  Noted +MARY but no features of SLE at this time.   Plan: predniSONE (DELTASONE) 5 MG tablet,         hydroxychloroquine (PLAQUENIL) 200 MG tablet,         XR Hand Bilateral G/E 3 Views  -New start HCQ 2 tab daily -- requires annual eye exams   -Start prednisone taper: 20 mg, DAILY , For 7 days, THEN 10 mg, DAILY Starting For 14 days, THEN 5 mg, DAILY Starting, For 60 days Oral.   -Take prednisone in AM with meals and avoid taking with other NSAIDs.   -Baseline x-ray hands ordered   -Most recent labs reviewed, noted elevated ESR slightly to 27, CRP was WNL.     Follow up in 3mo    Thank you very much for allowing me to participate in the care of  Noah Please call with any questions.        Lida Thurman MD  [facility]     PAST MEDICAL HISTORY  Past Medical History:   Diagnosis Date    Essential hypertension     High cholesterol     reviewed  Past Surgical History:   Procedure Laterality Date    NO PAST SURGERIES      reviewed    MEDICATIONS  Current Outpatient Medications   Medication Sig Dispense Refill    fexofenadine (ALLEGRA) 180 MG tablet Take 1 tablet (180 mg) by mouth at bedtime. 90 tablet 1    fluticasone (FLONASE) 50 MCG/ACT nasal spray Spray 1 spray into both nostrils daily. 16 g 2    hydroxychloroquine (PLAQUENIL) 200 MG tablet Take 1 tablet (200 mg) by mouth 2 times daily. 180 tablet 1    NIFEdipine ER (ADALAT CC) 60 MG 24 hr tablet Take 1 tablet (60 mg) by mouth daily. 90 tablet 1    predniSONE (DELTASONE) 5 MG tablet Take 4 tablets (20 mg) by mouth daily for 7 days, THEN 2 tablets (10 mg) daily for 14 days, THEN 1 tablet (5 mg) daily. 116 tablet 0       ALLERGIES  No Known Allergies    SOCIAL HISTORY  Social History     Socioeconomic History    Marital status: Single     Spouse  name: Not on file    Number of children: Not on file    Years of education: Not on file    Highest education level: Not on file   Occupational History    Not on file   Tobacco Use    Smoking status: Never     Passive exposure: Never    Smokeless tobacco: Never   Vaping Use    Vaping status: Never Used   Substance and Sexual Activity    Alcohol use: Never    Drug use: Never    Sexual activity: Yes   Other Topics Concern    Not on file   Social History Narrative    Not on file     Social Drivers of Health     Financial Resource Strain: Not on file   Food Insecurity: Not on file   Transportation Needs: Not on file   Physical Activity: Not on file   Stress: Not on file   Social Connections: Not on file   Interpersonal Safety: Low Risk  (5/23/2025)    Interpersonal Safety     Do you feel physically and emotionally safe where you currently live?: Yes     Within the past 12 months, have you been hit, slapped, kicked or otherwise physically hurt by someone?: No     Within the past 12 months, have you been humiliated or emotionally abused in other ways by your partner or ex-partner?: No   Housing Stability: Not on file    reviewed    FAMILY HISTORY  Family History   Problem Relation Age of Onset    No Known Problems Mother     No Known Problems Father     No Known Problems Sister     No Known Problems Brother     No Known Problems Brother     No Known Problems Brother     No Known Problems Brother    : reviewed .         Objective   PHYSICAL EXAMINATION  BP (!) 167/109 (BP Location: Left arm, Patient Position: Sitting, Cuff Size: Adult Large)   Pulse 67   Wt 79.4 kg (175 lb)   LMP 05/23/2025 (Exact Date)   SpO2 100%   BMI 32.01 kg/m  : reviewed    Physical Exam    GENERAL: Pleasant and cooperative, in no distress. Alert and Oriented x 3.  SKIN: no psoriasis. No Raynaud's.    MUSCULOSKELETAL:  Shoulders: Normal ROM, tender   Elbows:  No extension deficits or contractures. No tophi or rheumatoid  nodules.  Wrists:  b/l  synovitis of wrists, tender L>R  Hands:  +synovitis of b/l 2nd and 3rd MCPS, +synovitis fo Left 3rd PIP  Knees: No crepitus.  No swelling or effusion but pain on ROM  Ankles: b/l synovitis and pain, unable to ambulate comfortably  Feet: + pain on metatarsal .  Back: No scoliosis,  normal spine flexion, extension, rotation, lateral bending.  Normal chest expansion.  Negative Myofascial tender points.    LABS: Past labs reviewed and discussed with the patient.        Latest Ref Rng & Units 3/13/2023    12:29 PM 1/27/2025     1:23 PM 5/23/2025     9:51 AM   RHEUM RESULTS   Albumin 3.5 - 5.2 g/dL  4.5  4.1    ALT 0 - 50 U/L  11  14    AST 0 - 45 U/L  19  16    MARY interpretation Negative   Positive    MARY pattern 1    Speckled    MARY pattern 2    Nuclear dots    MARY titer 1    1:320    MARY titer 2    1:320    Creatinine 0.51 - 0.95 mg/dL 0.88  0.91  0.80    CRP Inflammation <5.00 mg/L   <3.00    GFR Estimate >60 mL/min/1.73m2 85  80  >90    Hematocrit 35.0 - 47.0 % 39.0  44.6     Hemoglobin 11.7 - 15.7 g/dL 12.8  14.9     WBC 4.0 - 11.0 10e3/uL 6.4  10.0     RBC Count 3.80 - 5.20 10e6/uL 4.90  5.81     RDW 10.0 - 15.0 % 12.7  13.0     MCHC 31.5 - 36.5 g/dL 32.8  33.4     MCV 78 - 100 fL 80  77     Platelet Count 150 - 450 10e3/uL 207  289     Sed Rate 0 - 20 mm/hr   27        Last three Labs:  Hemoglobin   Date Value Ref Range Status   01/27/2025 14.9 11.7 - 15.7 g/dL Final   03/13/2023 12.8 11.7 - 15.7 g/dL Final   02/20/2023 12.1 11.7 - 15.7 g/dL Final     Urea Nitrogen   Date Value Ref Range Status   05/23/2025 11.4 6.0 - 20.0 mg/dL Final   01/27/2025 12.7 6.0 - 20.0 mg/dL Final   03/13/2023 10.0 6.0 - 20.0 mg/dL Final     Erythrocyte Sedimentation Rate   Date Value Ref Range Status   05/23/2025 27 (H) 0 - 20 mm/hr Final     AST   Date Value Ref Range Status   05/23/2025 16 0 - 45 U/L Final   01/27/2025 19 0 - 45 U/L Final   02/20/2023 23 10 - 35 U/L Final     Albumin   Date Value Ref Range Status   05/23/2025  4.1 3.5 - 5.2 g/dL Final   01/27/2025 4.5 3.5 - 5.2 g/dL Final   02/20/2023 4.0 3.5 - 5.2 g/dL Final     Alkaline Phosphatase   Date Value Ref Range Status   05/23/2025 76 40 - 150 U/L Final   01/27/2025 80 40 - 150 U/L Final   02/20/2023 65 35 - 104 U/L Final     ALT   Date Value Ref Range Status   05/23/2025 14 0 - 50 U/L Final   01/27/2025 11 0 - 50 U/L Final   02/20/2023 9 (L) 10 - 35 U/L Final     Rheumatoid Factor   Date Value Ref Range Status   05/23/2025 >650 (H) <14 IU/mL Final       Component  Ref Range & Units 3 wk ago    Cyclic Citrullinated Peptide Antibody IgG  <7.0 U/mL >340.0 High    Comment: Positive         IMAGING: Reviewed    Lida Thurman MD  [facility]

## 2025-07-22 ENCOUNTER — PATIENT OUTREACH (OUTPATIENT)
Dept: CARE COORDINATION | Facility: CLINIC | Age: 45
End: 2025-07-22
Payer: COMMERCIAL